# Patient Record
Sex: MALE | Race: WHITE | NOT HISPANIC OR LATINO | Employment: OTHER | URBAN - METROPOLITAN AREA
[De-identification: names, ages, dates, MRNs, and addresses within clinical notes are randomized per-mention and may not be internally consistent; named-entity substitution may affect disease eponyms.]

---

## 2017-05-25 ENCOUNTER — PATIENT OUTREACH (OUTPATIENT)
Dept: HEALTH INFORMATION MANAGEMENT | Facility: OTHER | Age: 67
End: 2017-05-25

## 2017-05-25 NOTE — PROGRESS NOTES
Attempt #:1    WebIZ Checked & Epic Updated: no  HealthConnect Verified: no  Verify PCP: yes    Communication Preference Obtained: yes     Review Care Team: yes    Annual Wellness Visit Scheduling  1. Scheduling Status:Not Scheduled. Patient states they are not interested     PT STATES HE COMPLETED AWV WITH NON [facility] PCP   Care Gap Scheduling (Attempt to Schedule EACH Overdue Care Gap!)     There are no preventive care reminders to display for this patient.      Almondy Activation: declined  Almondy Naty: no  Virtual Visits: no  Opt In to Text Messages: no

## 2017-06-17 ENCOUNTER — PATIENT OUTREACH (OUTPATIENT)
Dept: HEALTH INFORMATION MANAGEMENT | Facility: OTHER | Age: 67
End: 2017-06-17

## 2017-12-07 ENCOUNTER — TELEPHONE (OUTPATIENT)
Dept: PULMONOLOGY | Facility: HOSPICE | Age: 67
End: 2017-12-07

## 2017-12-07 DIAGNOSIS — J44.9 CHRONIC OBSTRUCTIVE PULMONARY DISEASE, UNSPECIFIED COPD TYPE (HCC): ICD-10-CM

## 2017-12-13 ENCOUNTER — OFFICE VISIT (OUTPATIENT)
Dept: PULMONOLOGY | Facility: HOSPICE | Age: 67
End: 2017-12-13
Payer: MEDICARE

## 2017-12-13 ENCOUNTER — NON-PROVIDER VISIT (OUTPATIENT)
Dept: PULMONOLOGY | Facility: HOSPICE | Age: 67
End: 2017-12-13
Payer: MEDICARE

## 2017-12-13 VITALS
HEART RATE: 81 BPM | RESPIRATION RATE: 16 BRPM | DIASTOLIC BLOOD PRESSURE: 78 MMHG | SYSTOLIC BLOOD PRESSURE: 126 MMHG | WEIGHT: 190 LBS | OXYGEN SATURATION: 91 % | BODY MASS INDEX: 27.2 KG/M2 | HEIGHT: 70 IN | TEMPERATURE: 97.9 F

## 2017-12-13 DIAGNOSIS — Z65.8 PSYCHOSOCIAL STRESSORS: ICD-10-CM

## 2017-12-13 DIAGNOSIS — F10.10 ALCOHOL ABUSE: ICD-10-CM

## 2017-12-13 DIAGNOSIS — R62.51 FAILURE TO THRIVE (0-17): ICD-10-CM

## 2017-12-13 DIAGNOSIS — J44.9 CHRONIC OBSTRUCTIVE PULMONARY DISEASE, UNSPECIFIED COPD TYPE (HCC): ICD-10-CM

## 2017-12-13 DIAGNOSIS — I27.81 COR PULMONALE (HCC): ICD-10-CM

## 2017-12-13 PROCEDURE — 99204 OFFICE O/P NEW MOD 45 MIN: CPT | Performed by: INTERNAL MEDICINE

## 2017-12-13 RX ORDER — ALBUTEROL SULFATE 90 UG/1
AEROSOL, METERED RESPIRATORY (INHALATION)
Refills: 3 | COMMUNITY
Start: 2017-11-17

## 2017-12-13 RX ORDER — BUMETANIDE 1 MG/1
1 TABLET ORAL DAILY
COMMUNITY
End: 2019-03-06

## 2017-12-13 RX ORDER — UREA 10 %
800 LOTION (ML) TOPICAL DAILY
COMMUNITY

## 2017-12-13 RX ORDER — ARFORMOTEROL TARTRATE 15 UG/2ML
15 SOLUTION RESPIRATORY (INHALATION) 2 TIMES DAILY
Qty: 60 ML | Refills: 6 | Status: SHIPPED | OUTPATIENT
Start: 2017-12-13 | End: 2018-02-09

## 2017-12-13 RX ORDER — ALBUTEROL SULFATE 2.5 MG/3ML
SOLUTION RESPIRATORY (INHALATION)
Refills: 11 | COMMUNITY
Start: 2017-11-12 | End: 2020-02-26

## 2017-12-13 NOTE — PROGRESS NOTES
Chief Complaint   Patient presents with   • Establish Care     Referred by  for COPD.       HPI: This patient is a 67 y.o. Male who is referred for COPD. He was evaluated through [facility] in 2012 for SS Disability evaluation at which time pulmonary function testing confirmed severe COPD, FEV1 1 L. He continued to smoke until 9 months ago. He has had progressively worsening exertional dyspnea over the past years, and is now symptomatic with ADLs. He has chronic cough with clear sputum and lower extremity edema. He has chronic wheezing and chest tightness. He cannot afford inhalers (Spiriva) and has been using his Ventolin/Albuterol nebulized excessively. He uses one Ventolin canister weekly.  He has an oxygen concentrator which he inherited, however does not have portable oxygen. He was placed on prednisone empirically by his PCP without subjective benefit, and has since tapered off.  He has a history of alcohol abuse, and continues to drink.     Past Medical History:   Diagnosis Date   • Asthma    • Back pain    • Bronchitis    • Chickenpox    • Chronic obstructive pulmonary disease (CMS-HCC)    • Depression    • [place] measles    • Hypertension    • Influenza    • Mumps    • Nasal drainage    • Pneumonia    • Pulmonary emphysema (CMS-[facility])    • Pulmonary hypertension    • Restless leg syndrome    • Tonsillitis        Social History     Social History   • Marital status: Single     Spouse name: N/A   • Number of children: N/A   • Years of education: N/A     Occupational History   • Not on file.     Social History Main Topics   • Smoking status: Former Smoker     Packs/day: 1.00     Years: 35.00     Types: Cigarettes     Quit date: 6/1/2017   • Smokeless tobacco: Never Used   • Alcohol use Yes      Comment: 12beers/day   • Drug use: No   • Sexual activity: Not on file     Other Topics Concern   • Not on file     Social History Narrative   • No narrative on file       Family History   Problem Relation Age of Onset    • Diabetes Mother    • Heart Disease Father        Current Outpatient Prescriptions on File Prior to Visit   Medication Sig Dispense Refill   • calcium carbonate (TUMS) 500 MG Chew Tab Take 1 Tab by mouth every four hours as needed (indigestion). 30 Tab 3   • enalapril (VASOTEC) 10 MG Tab Take 10 mg by mouth every day.     • hydrochlorothiazide (HYDRODIURIL) 25 MG Tab Take 25 mg by mouth every day.     • Glucosamine-Chondroit-Vit C-Mn (GLUCOSAMINE CHONDR 1500 COMPLX PO) Take 2 Tabs by mouth every day.     • acetaminophen 650 MG Tab Take 650 mg by mouth every 6 hours as needed (Mild Pain; (Pain scale 1-3); Temp greater than 100.5 F). 30 Tab 0   • heparin 5000 UNIT/ML Solution Inject 1 mL as instructed every 8 hours.  0   • fluoxetine (PROZAC) 20 MG Cap Take 1 Cap by mouth every day. 30 Cap 0   • ondansetron (ZOFRAN ODT) 4 MG TABLET DISPERSIBLE Take 1 Tab by mouth every four hours as needed for Nausea/Vomiting (give PO if no IV route available). 10 Tab 0   • docusate sodium 100mg/10mL (COLACE) 150 MG/15ML Liquid Take 10 mL by mouth every morning. 300 mL    • nicotine (NICODERM) 21 MG/24HR PATCH 24 HR Apply 1 Patch to skin as directed every 24 hours. 30 Patch 0   • nicotine polacrilex (NICORETTE) 2 MG Gum Take 1 Each by mouth every 1 hour as needed (For nicotine urge).  3   • CALCIUM PO Take 1 Tab by mouth every day.       No current facility-administered medications on file prior to visit.        Allergies: Codeine and Pcn [penicillins]    ROS:   Constitutional: Denies fevers, chills, night sweats, +fatigue,+ weight loss  Eyes: Denies vision loss, pain, drainage, double vision  Ears, Nose, Throat: Denies earache, +difficulty hearing, denies tinnitus, +nasal congestion, denies hoarseness  Cardiovascular: Denies chest pain, +tightness, denies palpitations, orthopnea, +LEedema  Respiratory: Denies shortness of breath, cough, wheezing, hemoptysis  Sleep: Denies daytime sleepiness, snoring, apneas, insomnia, morning  "headaches  GI: +heartburn, dysphagia, nausea, abdominal pain, diarrhea or constipation  : + frequent urination, denies hematuria, discharge or painful urination  Musculoskeletal: + back pain, painful joints, sore muscles  Neurological: + weakness, denies headaches  Skin: No rashes    Blood pressure 126/78, pulse 81, temperature 36.6 °C (97.9 °F), resp. rate 16, height 1.778 m (5' 10\"), weight 86.2 kg (190 lb), SpO2 91 %.    Physical Exam:  Appearance: Wheelchair-bound, in no acute distress  HEENT: Normocephalic, atraumatic, white sclera, PERRLA, oropharynx clear, poor dentition  Neck: No adenopathy or masses  Respiratory: no intercostal retractions or accessory muscle use  Lungs auscultation: Clear to auscultation bilaterally, diminished breath sounds  Cardiovascular: Regular rate rhythm. No murmurs, rubs or gallops.  1+ LE edema  Abdomen: soft, nondistended  Gait: Wheelchair-bound  Digits: No clubbing, cyanosis  Motor: No tremors  Orientation: Oriented to time, person and place    Diagnosis:  1. Chronic obstructive pulmonary disease, unspecified COPD type (CMS-HCC)  CT-CHEST (THORAX) W/O   2. Cor pulmonale (CMS-HCC)     3. Psychosocial stressors     4. Alcohol abuse     5. Failure to thrive (0-17)  CT-CHEST (THORAX) W/O       Plan:  The patient has chronic respiratory failure secondary to severe COPD. He quit smoking 9 months ago. He is on short acting beta agonist therapy only on account of the expense, which he is requiring excessively. He has failure to thrive, which may also be in part due to alcohol use.   He would benefit from supplemental oxygen 24/7.  Complete and permanent smoking cessation encouraged. We also discussed cutting back on his drinking.  We have provided him a list of resources for medication assistance.   Start nebulized formoterol twice a day, which may be more affordable than inhalers.  Continue albuterol every 4 hours when necessary.  Start supplemental oxygen at 2 L/m.   He was unable " to perform pulmonary function testing today. We will defer to a future date when able.  Obtain chest CAT scan to exclude comorbidities, i.e. malignancy, infection.  Return for after CT scan.

## 2017-12-14 ENCOUNTER — TELEPHONE (OUTPATIENT)
Dept: PULMONOLOGY | Facility: HOSPICE | Age: 67
End: 2017-12-14

## 2017-12-14 NOTE — TELEPHONE ENCOUNTER
The patient's wife, Becca called and stated that the Waylon copay was 500. Dollars.  I told her that it needed to be billed through Medicare Part B and then the copay would go way down.  I called the pharmacy and spoke to Stacy.  She did the computer work for the Part B billing and now the patient needs to go fill out paperwork.  I called Becca and told her that she needed to go to the pharmacy to fill out the paperwork and then they should be able to bill Part B.  She agreed to go do that now.

## 2017-12-21 ENCOUNTER — TELEPHONE (OUTPATIENT)
Dept: PULMONOLOGY | Facility: HOSPICE | Age: 67
End: 2017-12-21

## 2017-12-21 NOTE — TELEPHONE ENCOUNTER
The patient's wife, Becca called and stated that they still have not been able to get the Brovana.  I called the pharmacy and told them that they should have billed it through Medicare Part B on the 12/14 as the patient went in and filled out the paperwork then.  The tech that I spoke to told me that Stacy has been sick for the last two days, but that shouldn't matter as this has been since the 14th.  The tech put me on hold and ran the med again and it went through.  She stated that she will call the patient and let them know that they can come  the Brovana today.

## 2017-12-22 ENCOUNTER — HOSPITAL ENCOUNTER (OUTPATIENT)
Dept: RADIOLOGY | Facility: MEDICAL CENTER | Age: 67
End: 2017-12-22
Attending: INTERNAL MEDICINE
Payer: MEDICARE

## 2017-12-22 DIAGNOSIS — J44.9 CHRONIC OBSTRUCTIVE PULMONARY DISEASE, UNSPECIFIED COPD TYPE (HCC): ICD-10-CM

## 2017-12-22 DIAGNOSIS — R62.51 FAILURE TO THRIVE (0-17): ICD-10-CM

## 2017-12-22 PROCEDURE — 71250 CT THORAX DX C-: CPT

## 2018-01-16 ENCOUNTER — OFFICE VISIT (OUTPATIENT)
Dept: PULMONOLOGY | Facility: HOSPICE | Age: 68
End: 2018-01-16
Payer: MEDICARE

## 2018-01-16 VITALS
DIASTOLIC BLOOD PRESSURE: 82 MMHG | OXYGEN SATURATION: 91 % | RESPIRATION RATE: 15 BRPM | WEIGHT: 190 LBS | BODY MASS INDEX: 27.2 KG/M2 | SYSTOLIC BLOOD PRESSURE: 132 MMHG | HEART RATE: 115 BPM | HEIGHT: 70 IN

## 2018-01-16 DIAGNOSIS — J43.9 PULMONARY EMPHYSEMA, UNSPECIFIED EMPHYSEMA TYPE (HCC): ICD-10-CM

## 2018-01-16 DIAGNOSIS — J96.01 ACUTE RESPIRATORY FAILURE WITH HYPOXIA (HCC): ICD-10-CM

## 2018-01-16 NOTE — PROGRESS NOTES
Patient presented to the office today for CT results. He has very severe COPD FEV1 less than 1 L. He reports worsening dyspnea, cough and wheeze for the past 3 days. He reports generalized body aches. He did not have an influenza vaccine. He is tachycardic. He cannot speak in full sentences. He has not been able to use his nebulizer at home due to severe shortness of breath. He has significant increased work of breathing, audible wheeze. I recommended further evaluation in the ER. He will likely need to be admitted for higher level of care. He is hesitant due to cost of hospitalization. His significant other encourages him to be seen today. He agrees. He is transported via wheelchair by one of our medical assistant to [facility] emergency room. He will follow up after hospital stay.

## 2018-01-25 ENCOUNTER — TELEPHONE (OUTPATIENT)
Dept: PULMONOLOGY | Facility: HOSPICE | Age: 68
End: 2018-01-25

## 2018-01-26 NOTE — TELEPHONE ENCOUNTER
Temi,    The pt called requesting PA for rx Brovana neb ($209) and Spiriva ($400), states that they are to expensive and he doesn't know what he is going to do.  Please help!

## 2018-01-26 NOTE — TELEPHONE ENCOUNTER
Last time patient was seen he was sent to the hospital. Can you ask him if these are medications that were prescribed by the Hospital. If so please obtain discharge summary. We can provide samples of Spiriva in the meantime, but need DC summary to review

## 2018-01-26 NOTE — TELEPHONE ENCOUNTER
I spoke to this patient.  He doesn't want the Brovana, he states that it doesn't work for him.  He just likes Ventolin.  I am trying to fill out Spiriva Assistance paperwork for him but I don't know what strength Spiriva to put on the paperwork as I don't see that he was ever prescribed Spiriva.  Please advise, thank you.

## 2018-01-31 ENCOUNTER — TELEPHONE (OUTPATIENT)
Dept: PULMONOLOGY | Facility: HOSPICE | Age: 68
End: 2018-01-31

## 2018-01-31 DIAGNOSIS — J44.9 CHRONIC OBSTRUCTIVE PULMONARY DISEASE, UNSPECIFIED COPD TYPE (HCC): ICD-10-CM

## 2018-01-31 NOTE — TELEPHONE ENCOUNTER
This Rx is to be mailed along with the [employer] application to the patient's home. TB        Have we ever prescribed this med? No.  If yes, what date? New Rx, ok'd per Dr. Kim.    Last OV: 12/13/2017    Next OV: 2/9/2018    DX: COPD    Medications: Spiriva Respimat

## 2018-01-31 NOTE — TELEPHONE ENCOUNTER
This patient called and he is asking for assistance for Spiriva.  I started filling out the form to send him and realized that Spiriva was never prescribed for him as he could not afford it.  I got the DC summary from when he was in the hospital and they did not prescribe it there either.  Do you want me to do the assistance form for him and if so, what strength of Spiriva do you want me to request?  Please advise, thank you.

## 2018-02-08 ENCOUNTER — TELEPHONE (OUTPATIENT)
Dept: PULMONOLOGY | Facility: HOSPICE | Age: 68
End: 2018-02-08

## 2018-02-08 NOTE — TELEPHONE ENCOUNTER
The patient does not have prescription insurance.  That's why I sent the Spiriva assistance paperwork to him.

## 2018-02-08 NOTE — TELEPHONE ENCOUNTER
Ok he can discuss inhalers further at his next visit with Kahlil tomorrow. We can try to provide samples

## 2018-02-09 ENCOUNTER — OFFICE VISIT (OUTPATIENT)
Dept: PULMONOLOGY | Facility: HOSPICE | Age: 68
End: 2018-02-09
Payer: MEDICARE

## 2018-02-09 VITALS
SYSTOLIC BLOOD PRESSURE: 110 MMHG | DIASTOLIC BLOOD PRESSURE: 68 MMHG | HEIGHT: 70 IN | TEMPERATURE: 98.2 F | RESPIRATION RATE: 16 BRPM | WEIGHT: 175 LBS | OXYGEN SATURATION: 96 % | HEART RATE: 84 BPM | BODY MASS INDEX: 25.05 KG/M2

## 2018-02-09 DIAGNOSIS — F17.200 CURRENT SMOKER: ICD-10-CM

## 2018-02-09 DIAGNOSIS — R91.8 PULMONARY NODULES: ICD-10-CM

## 2018-02-09 DIAGNOSIS — R09.02 HYPOXIA: ICD-10-CM

## 2018-02-09 DIAGNOSIS — J96.11 CHRONIC RESPIRATORY FAILURE WITH HYPOXIA (HCC): ICD-10-CM

## 2018-02-09 DIAGNOSIS — J44.9 CHRONIC OBSTRUCTIVE PULMONARY DISEASE, UNSPECIFIED COPD TYPE (HCC): ICD-10-CM

## 2018-02-09 DIAGNOSIS — R59.9 ADENOPATHY: ICD-10-CM

## 2018-02-09 DIAGNOSIS — J84.9 INTERSTITIAL LUNG DISEASE (HCC): ICD-10-CM

## 2018-02-09 DIAGNOSIS — J43.2 CENTRILOBULAR EMPHYSEMA (HCC): ICD-10-CM

## 2018-02-09 PROCEDURE — 94761 N-INVAS EAR/PLS OXIMETRY MLT: CPT | Performed by: NURSE PRACTITIONER

## 2018-02-09 PROCEDURE — 99214 OFFICE O/P EST MOD 30 MIN: CPT | Performed by: NURSE PRACTITIONER

## 2018-02-09 RX ORDER — LORATADINE 10 MG
81 TABLET ORAL
Refills: 0 | COMMUNITY
Start: 2018-01-24 | End: 2019-03-06

## 2018-02-09 RX ORDER — DILTIAZEM HYDROCHLORIDE 120 MG/1
120 CAPSULE, EXTENDED RELEASE ORAL
Refills: 0 | COMMUNITY
Start: 2018-01-24 | End: 2019-03-06

## 2018-02-09 NOTE — PATIENT INSTRUCTIONS
1.  samples for Breo 200 1 puff daily, Spiriva Respimat 2 puffs once daily. Rinse mouth after use. May use Ventolin every 4hrs as needed as a rescue inhaler.

## 2018-02-09 NOTE — PROCEDURES
Multi-Ox Readings  Multi Ox #1     O2 sat % at rest 88   O2 sat % on exertion 87   O2 sat average on exertion     Multi Ox #2 3 LPM   O2 sat % at rest 96   O2 sat % on exertion 95   O2 sat average on exertion       Oxygen Use 1.5 (O2 concentrator )   Oxygen Frequency 24/7   Duration of need     Is the patient mobile within the home?     CPAP Use?     BIPAP Use?     Servo Titration

## 2018-02-09 NOTE — PROGRESS NOTES
Chief Complaint   Patient presents with   • Follow-Up       HPI:  Parveen Blanc is a 67 y.o. year old male here today for follow-up on referred for COPD. He was evaluated through [facility] in 2012 for SS Disability evaluation at which time pulmonary function testing confirmed severe COPD, FEV1 1 L.  He has had progressively worsening exertional dyspnea over the past years, and is now symptomatic with ADLs. He has chronic cough with clear sputum and lower extremity edema. He has chronic wheezing and chest tightness. He cannot afford inhalers (Spiriva) and has been using his Ventolin/Albuterol nebulized excessively. He uses one Ventolin canister weekly.  He has an oxygen concentrator which he inherited, however does not have portable oxygen. He was placed on prednisone empirically by his PCP without subjective benefit, and has since tapered off.  He has a history of alcohol abuse, and continues to drink. Last OV, he presented for evaluation for CT results and was noted tachycardic, unable to speak full sentences due to significant dyspnea and wheezing. He was admitted to [facility]. He was treated for AECOPD with IV steroids and then oral doxycycline due to suspected pneumonia with sepsis with prednisone taper. Cultures were negative to date. Dimer was negative. He was put on oxygen therapy and DC'd home with it. Multi ox in office today indicates need for 3L at rest/exertion. Reviewed this with patient. Atrial fibrillation was noted on monitoring but rate controlled with diltiazem. He declined use of anticoagulation. He is taking an 81mg ASA daily. ECHO indicated normal LVEF, Grade I Diastolic dysfunction and LVH. Troponin negative.     CT scan 12/22/17 indicated:   1.  Centrilobular and paraseptal emphysema is most pronounced in the upper lobes and superior segments of the lower lobes.  2.  Peripheral reticular disease in the lung bases, suggestive of early interstitial disease.  3.  Subcentimeter left lower lobe nodules are  likely inflammatory in nature. Recommendations are given below.  4.  Atherosclerosis.  5.  Mild nonspecific enlargement in mediastinal lymph nodes.  6.  Lobular hepatic contour suggestive of cirrhosis.  7.  Splenic size is at the upper limits of normal.  8.  Cholelithiasis    I reviewed findings with patient and wife. We will repeat CT scan in 4-6 mos for nodule monitoring. We may want to pursue HRCT, but patient is not interested in ILD work up due to possible cost. We reviewed smoking and alcohol cessation in depth. He currently drinks 4 beers daily and smokes 2 cigarettes. Advised to f/u with PCP for abdominal U/S for splenic and hepatic exam findings.    Today he notes dyspnea with minimal exertion and ADL's. He feels better since hospitalization. He notes occasional cough with minimal phlegm production and no current wheezing. He denies fever, chills, night sweats, chest pain, or hemoptysis. He does have pedal edema, L>R with pitting. He presents in a wheelchair today. RA sat was initially 96% with rooming but dropped to 87%. He wants to make changes to health, but declines most things with any cost. He is pursuing assistance program for spiriva respimat use. He has been using Ventolin up to 6-8x's per day. We discussed inhalers indepth. I will obtain samples for him to start therapy.         ROS: As per HPI and otherwise negative if not stated.    Past Medical History:   Diagnosis Date   • Asthma    • Back pain    • Bronchitis    • Chickenpox    • Chronic obstructive pulmonary disease (CMS-HCC)    • Depression    • [place] measles    • Hypertension    • Influenza    • Mumps    • Nasal drainage    • Pneumonia    • Pulmonary emphysema (CMS-HCC)    • Pulmonary hypertension    • Restless leg syndrome    • Tonsillitis        Past Surgical History:   Procedure Laterality Date   • LAMINOTOMY     • TONSILLECTOMY         Family History   Problem Relation Age of Onset   • Diabetes Mother    • Heart Disease Father   "      Social History     Social History   • Marital status: Single     Spouse name: N/A   • Number of children: N/A   • Years of education: N/A     Occupational History   • Not on file.     Social History Main Topics   • Smoking status: Current Every Day Smoker     Packs/day: 0.50     Years: 35.00     Types: Cigarettes     Last attempt to quit: 6/1/2017   • Smokeless tobacco: Never Used   • Alcohol use Yes      Comment: 12 beers/day   • Drug use: No   • Sexual activity: Not on file     Other Topics Concern   • Not on file     Social History Narrative   • No narrative on file       Allergies as of 02/09/2018 - Reviewed 02/09/2018   Allergen Reaction Noted   • Codeine Hives 01/26/2016   • Pcn [penicillins] Hives 01/26/2016        @Vital signs for this encounter:  Vitals:    02/09/18 0812   Height: 1.778 m (5' 10\")   Weight: 79.4 kg (175 lb)   Weight % change since last entry.: 0 %   BP: 110/68   Pulse: 84   BMI (Calculated): 25.11   Resp: 16   Temp: 36.8 °C (98.2 °F)   O2 sat % on O2: 96 %       Current medications as of today   Current Outpatient Prescriptions   Medication Sig Dispense Refill   • [employer] ASPIRIN ADULT LOW DOSE 81 MG Chew Tab chewable tablet Take 81 mg by mouth every day.  0   • diltiazem (CARDIZEM SR) 120 MG SR capsule Take 120 mg by mouth every day.  0   • VENTOLIN  (90 Base) MCG/ACT Aero Soln inhalation aerosol INHALE 1-2 PUFFS EVERY 4-6 HOURS AS NEEDED AND AS DIRECTED.  3   • Multiple Vitamins-Minerals (CENTRUM SILVER 50+MEN) Tab Take 1 tablet by mouth every day.     • bumetanide (BUMEX) 1 MG Tab Take 1 mg by mouth every day.     • folic acid (FOLVITE) 800 MCG tablet Take 800 mcg by mouth every day.     • acetaminophen 650 MG Tab Take 650 mg by mouth every 6 hours as needed (Mild Pain; (Pain scale 1-3); Temp greater than 100.5 F). 30 Tab 0   • heparin 5000 UNIT/ML Solution Inject 1 mL as instructed every 8 hours.  0   • docusate sodium 100mg/10mL (COLACE) 150 MG/15ML Liquid Take 10 mL by mouth " every morning. 300 mL    • enalapril (VASOTEC) 10 MG Tab Take 10 mg by mouth every day.     • Glucosamine-Chondroit-Vit C-Mn (GLUCOSAMINE CHONDR 1500 COMPLX PO) Take 2 Tabs by mouth every day.     • Tiotropium Bromide Monohydrate (SPIRIVA RESPIMAT) 2.5 MCG/ACT Aero Soln Inhale 2 Puffs by mouth every day. 3 Inhaler 3   • albuterol (PROVENTIL) 2.5mg/3ml Nebu Soln solution for nebulization USE 1 UNIT DOSE IN NEBULIZER EVERY 4 HOURS AS NEEDED.  11   • Arformoterol Tartrate (BROVANA) 15 MCG/2ML Nebu Soln 2 mL by Nebulization route 2 Times a Day. 1 vial via nebulizer twice a day. 60 mL 6   • calcium carbonate (TUMS) 500 MG Chew Tab Take 1 Tab by mouth every four hours as needed (indigestion). 30 Tab 3   • fluoxetine (PROZAC) 20 MG Cap Take 1 Cap by mouth every day. 30 Cap 0   • ondansetron (ZOFRAN ODT) 4 MG TABLET DISPERSIBLE Take 1 Tab by mouth every four hours as needed for Nausea/Vomiting (give PO if no IV route available). 10 Tab 0   • nicotine (NICODERM) 21 MG/24HR PATCH 24 HR Apply 1 Patch to skin as directed every 24 hours. 30 Patch 0   • nicotine polacrilex (NICORETTE) 2 MG Gum Take 1 Each by mouth every 1 hour as needed (For nicotine urge).  3   • hydrochlorothiazide (HYDRODIURIL) 25 MG Tab Take 25 mg by mouth every day.     • CALCIUM PO Take 1 Tab by mouth every day.       No current facility-administered medications for this visit.          Physical Exam:   Gen:           Alert and oriented, No apparent distress. Mood and affect appropriate, normal interaction with examiner.  Eyes:          PERRL, EOM intact, sclere white, conjunctive moist.  Ears:          Not examined.  Hearing:     Grossly intact.  Nose:          Normal, no lesions or deformities.  Dentition:    Poor dentition.  Oropharynx:   Tongue normal, posterior pharynx without erythema or exudate.  Mallampati Classification: 3  Neck:        Supple, trachea midline, no masses.  Respiratory Effort: No intercostal retractions or use of accessory muscles.    Lung Auscultation:      Diminished t/o; no rales but expiratory trace wheeze noted t/o.  CV:            Regular rate and rhythm. No murmurs, rubs or gallops.  Abd:           Not examined.  Lymphadenopathy: Not examined.  Gait and Station: Normal.  Digits and Nails: No clubbing, cyanosis, petechiae, or nodes.   Cranial Nerves: II-XII grossly intact.  Skin:        No rashes, lesions or ulcers noted.               Ext:           No cyanosis but BLE pedal 1+ pitting edema.      Assessment:  1. Chronic respiratory failure with hypoxia (CMS-HCC)     2. Centrilobular emphysema (CMS-HCC)     3. Current smoker     4. Pulmonary nodules     5. BMI 25.0-25.9,adult         Immunizations:    Flu:declines  Pneumovax 23:declines  Prevnar 13:declines    Plan:Face to face 40 minutes discussing copd/inhaler/oxygen education and pathophysiology. Answered all patient questions to their satisfaction.    1. Declines vaccination.  2. PFT to be completed prior to next OV. Would benefit from pulmonary rehab if he stops smoking.  3. Alcohol and smoking cessation discussed in depth.  4. Start Breo 200 1 puff daily, Spiriva Respimat 2 puffs once daily. Rinse mouth after use. May use Ventolin every 4hrs as needed as a rescue inhaler.  5. Complete CT scan in 4 months for f/u of pulmonary nodules. Consider ILD work up with HRCT.  6. F/u with PCP regarding U/S of abdominal due to CT scan results of hepatic and spleenic findings.  7. Discussed respiratory hygiene.  8. DME O2 use 3L 24/7.  9. Follow up in 2 months with PFT, sooner if needed.

## 2018-04-02 ENCOUNTER — HOSPITAL ENCOUNTER (OUTPATIENT)
Dept: RADIOLOGY | Facility: MEDICAL CENTER | Age: 68
End: 2018-04-02

## 2018-04-11 ENCOUNTER — OFFICE VISIT (OUTPATIENT)
Dept: PULMONOLOGY | Facility: HOSPICE | Age: 68
End: 2018-04-11
Payer: MEDICARE

## 2018-04-11 ENCOUNTER — NON-PROVIDER VISIT (OUTPATIENT)
Dept: PULMONOLOGY | Facility: HOSPICE | Age: 68
End: 2018-04-11
Payer: MEDICARE

## 2018-04-11 VITALS
DIASTOLIC BLOOD PRESSURE: 82 MMHG | BODY MASS INDEX: 25.05 KG/M2 | HEIGHT: 70 IN | TEMPERATURE: 98.1 F | WEIGHT: 175 LBS | SYSTOLIC BLOOD PRESSURE: 121 MMHG | OXYGEN SATURATION: 98 % | HEART RATE: 80 BPM | RESPIRATION RATE: 16 BRPM

## 2018-04-11 DIAGNOSIS — J43.2 CENTRILOBULAR EMPHYSEMA (HCC): ICD-10-CM

## 2018-04-11 DIAGNOSIS — F17.200 CURRENT SMOKER: ICD-10-CM

## 2018-04-11 DIAGNOSIS — R91.8 PULMONARY NODULES: ICD-10-CM

## 2018-04-11 DIAGNOSIS — J96.11 CHRONIC RESPIRATORY FAILURE WITH HYPOXIA (HCC): ICD-10-CM

## 2018-04-11 DIAGNOSIS — J44.9 CHRONIC OBSTRUCTIVE PULMONARY DISEASE, UNSPECIFIED COPD TYPE (HCC): ICD-10-CM

## 2018-04-11 PROCEDURE — 94729 DIFFUSING CAPACITY: CPT | Performed by: INTERNAL MEDICINE

## 2018-04-11 PROCEDURE — 94060 EVALUATION OF WHEEZING: CPT | Performed by: INTERNAL MEDICINE

## 2018-04-11 PROCEDURE — 99214 OFFICE O/P EST MOD 30 MIN: CPT | Performed by: NURSE PRACTITIONER

## 2018-04-11 PROCEDURE — 94726 PLETHYSMOGRAPHY LUNG VOLUMES: CPT | Performed by: INTERNAL MEDICINE

## 2018-04-11 ASSESSMENT — PULMONARY FUNCTION TESTS
FEV1/FVC_PERCENT_LLN: 62
FEV1: .99
FEV1/FVC_PERCENT_PREDICTED: 45
FEV1/FVC: 37
FEV1/FVC_PERCENT_CHANGE: -6
FEV1/FVC: 34
FEV1/FVC_PERCENT_PREDICTED: 49
FEV1_PERCENT_PREDICTED: 32
FEV1_LLN: 2.71
FEV1_PERCENT_PREDICTED: 30
FEV1/FVC_PERCENT_PREDICTED: 74
FEV1/FVC: 34.26
FVC: 2.84
FEV1_PERCENT_CHANGE: 1
FEV1/FVC: 37
FVC_PREDICTED: 4.37
FEV1/FVC_PERCENT_CHANGE: -500
FEV1_PREDICTED: 3.24
FVC_LLN: 3.65
FVC_PERCENT_PREDICTED: 66
FVC: 2.89
FEV1/FVC_PERCENT_PREDICTED: 49
FEV1/FVC_PREDICTED: 74
FEV1/FVC_PERCENT_PREDICTED: 46
FVC_PERCENT_PREDICTED: 65
FEV1: 1.04
FEV1_PERCENT_CHANGE: -5

## 2018-04-11 NOTE — PROCEDURES
Good patient effort & cooperation.  The results of this test meet the ATS/ERS standards for acceptability and repeatability.  Predicted equations for Spirometry are N-Dulce II, ITS for lung volumes, and [place] for DLCO.  The DLCO was uncorrected for Hgb.  A bronchodilator of Ventolin HFA- 2puffs via spacer were administered.  DLCO was performed during dilation period.    1. Baseline spirometry demonstrates a severe reduction in FEV1 at 32% of predicted. FEV1/FVC ratio is reduced at 37%.    2. After administration of an inhaled bronchodilator there is no improvement in FEV1.    3. Lung volumes demonstrate hyperinflation.    4. Gas exchange as estimated by DLCO is reduced at 68% of predicted.    5. Airway resistance is increased.    6. Flow volume loop is consistent with obstruction.      Impression:    This study demonstrates the presence of severe obstructive lung disease. No reversibility is noted on the study.

## 2018-04-11 NOTE — LETTER
OJANIE Martinez  [facility]monary [facility]   [address],   [facility] [facility] [address] [place] [address]  Phone: [phone] - Fax: [phone]           Encounter Date: 4/11/2018  Provider: JOANIE Martinez  Location of Care: [facility] PULMONARY MEDICINE      Patient:   Parveen Blanc   MR Number: [id number]   YOB: 1950     PROGRESS NOTE:  No notes on file      Electronically signed by JOANIE Martinez  on 04/11/18    Agusto Bhatt M.D.  [id number]  [address] [place] [id number]  VIA Facsimile: [phone]

## 2018-04-11 NOTE — PROGRESS NOTES
Chief Complaint   Patient presents with   • Follow-Up       HPI:  Parveen Blanc is a 67 y.o. year old male here today for follow-up on COPD. He was evaluated through [facility] in 2012 for Disability evaluation at which time pulmonary function testing confirmed severe COPD, FEV1 1 L.  Updated PFT 4/11/2018 indicates FEV1 1.04 L or 32% predicted, FEV1/FVC ratio 37, and a DLCO 60% predicted. He has had progressively worsening exertional dyspnea over the past years, and is now symptomatic with ADLs. He was started on Breo 200mcg 1 puff Qd and Spiriva Respimat 2.5mcg 2 puffs QD last OV with significant benefit. He uses SOFIA 1x daily now instead of going through entire medication in 1 week. He relies on samples at the moment. He has no prescription coverage. He has not met criteria for assistance programs. He notes cough with chronic white phlegm production. He denies wheezing. He denies chest pain. He continues to smoke 1/2 pack daily, 17.5PYH. He has attempted Chantix with side effects. He has not followed up with PCP due to cost of visit.    He has a history of alcohol abuse, and continues to drink. Last OV, he presented for evaluation for CT results and was noted tachycardic, unable to speak full sentences due to significant dyspnea and wheezing. He was admitted to [facility] 1/2018. He was treated for AECOPD with IV steroids and then oral doxycycline due to suspected pneumonia with sepsis with prednisone taper. Cultures were negative to date. Dimer was negative. He was put on oxygen therapy and DC'd home with it. Multi ox in office today indicates need for 3L at rest/exertion. Reviewed this with patient. Atrial fibrillation was noted on monitoring but rate controlled with diltiazem. He declined use of anticoagulation. He is taking an 81mg ASA daily. ECHO indicated normal LVEF, Grade I Diastolic dysfunction and LVH. Troponin negative.      CT scan 12/22/17 indicated:   1.  Centrilobular and paraseptal emphysema is most  pronounced in the upper lobes and superior segments of the lower lobes.  2.  Peripheral reticular disease in the lung bases, suggestive of early interstitial disease.  3.  Subcentimeter left lower lobe nodules are likely inflammatory in nature. Recommendations are given below.  4.  Atherosclerosis.  5.  Mild nonspecific enlargement in mediastinal lymph nodes.  6.  Lobular hepatic contour suggestive of cirrhosis.  7.  Splenic size is at the upper limits of normal.  8.  Cholelithiasis     I reviewed findings with patient and wife. We will repeat CT scan in 4-6 mos for nodule monitoring - due May 2018. We may want to pursue HRCT, but patient is not interested in ILD work up due to possible cost. We reviewed smoking and alcohol cessation in depth. He currently drinks 4 beers daily and 1/2 pack smoking daily. Advised to f/u with PCP for abdominal U/S for splenic and hepatic exam findings.     ROS: As per HPI and otherwise negative if not stated.    Past Medical History:   Diagnosis Date   • Asthma    • Back pain    • Bronchitis    • Chickenpox    • Chronic obstructive pulmonary disease (CMS-HCC)    • Depression    • Welsh measles    • Hypertension    • Influenza    • Mumps    • Nasal drainage    • Pneumonia    • Pulmonary emphysema (CMS-HCC)    • Pulmonary hypertension    • Restless leg syndrome    • Tonsillitis        Past Surgical History:   Procedure Laterality Date   • LAMINOTOMY     • TONSILLECTOMY         Family History   Problem Relation Age of Onset   • Diabetes Mother    • Heart Disease Father        Social History     Social History   • Marital status: Single     Spouse name: N/A   • Number of children: N/A   • Years of education: N/A     Occupational History   • Not on file.     Social History Main Topics   • Smoking status: Current Every Day Smoker     Packs/day: 0.50     Years: 35.00     Types: Cigarettes     Last attempt to quit: 6/1/2017   • Smokeless tobacco: Never Used   • Alcohol use Yes      Comment:  "12 beers/day   • Drug use: No   • Sexual activity: Not on file     Other Topics Concern   • Not on file     Social History Narrative   • No narrative on file       Allergies as of 04/11/2018 - Reviewed 04/11/2018   Allergen Reaction Noted   • Codeine Hives 01/26/2016   • Pcn [penicillins] Hives 01/26/2016        @Vital signs for this encounter:  Vitals:    04/11/18 1402   Height: 1.778 m (5' 10\")   Weight: 79.4 kg (175 lb)   Weight % change since last entry.: 0 %   BP: 121/82   Pulse: 80   BMI (Calculated): 25.11   Resp: 16   Temp: 36.7 °C (98.1 °F)   O2 sat % on O2: 98 %       Current medications as of today   Current Outpatient Prescriptions   Medication Sig Dispense Refill   • Fluticasone Furoate-Vilanterol (BREO ELLIPTA) 200-25 MCG/INH AEROSOL POWDER, BREATH ACTIVATED Inhale 1 Puff by mouth every day. Rinse mouth after use. 2 Each 0   • Tiotropium Bromide Monohydrate (SPIRIVA RESPIMAT) 2.5 MCG/ACT Aero Soln Inhale 2 Puffs by mouth every day. 2 Inhaler 0   • [facility] ASPIRIN ADULT LOW DOSE 81 MG Chew Tab chewable tablet Take 81 mg by mouth every day.  0   • diltiazem (CARDIZEM SR) 120 MG SR capsule Take 120 mg by mouth every day.  0   • albuterol (PROVENTIL) 2.5mg/3ml Nebu Soln solution for nebulization USE 1 UNIT DOSE IN NEBULIZER EVERY 4 HOURS AS NEEDED.  11   • Multiple Vitamins-Minerals (CENTRUM SILVER 50+MEN) Tab Take 1 tablet by mouth every day.     • bumetanide (BUMEX) 1 MG Tab Take 1 mg by mouth every day.     • folic acid (FOLVITE) 800 MCG tablet Take 800 mcg by mouth every day.     • acetaminophen 650 MG Tab Take 650 mg by mouth every 6 hours as needed (Mild Pain; (Pain scale 1-3); Temp greater than 100.5 F). 30 Tab 0   • heparin 5000 UNIT/ML Solution Inject 1 mL as instructed every 8 hours.  0   • docusate sodium 100mg/10mL (COLACE) 150 MG/15ML Liquid Take 10 mL by mouth every morning. 300 mL    • enalapril (VASOTEC) 10 MG Tab Take 10 mg by mouth every day.     • Glucosamine-Chondroit-Vit C-Mn (GLUCOSAMINE " CHONDR 1500 COMPLX PO) Take 2 Tabs by mouth every day.     • VENTOLIN  (90 Base) MCG/ACT Aero Soln inhalation aerosol INHALE 1-2 PUFFS EVERY 4-6 HOURS AS NEEDED AND AS DIRECTED.  3   • calcium carbonate (TUMS) 500 MG Chew Tab Take 1 Tab by mouth every four hours as needed (indigestion). 30 Tab 3     No current facility-administered medications for this visit.          Physical Exam:   Gen:           Alert and oriented, No apparent distress. Mood and affect appropriate, normal interaction with examiner.  Eyes:          PERRL, EOM intact, sclere white, conjunctive moist.  Ears:          Not examined.  Hearing:     Grossly intact.  Nose:          Normal, no lesions or deformities.  Dentition:    Poor dentition.  Oropharynx:   Tongue normal, posterior pharynx without erythema or exudate.  Mallampati Classification: 2/3  Neck:        Supple, trachea midline, no masses.  Respiratory Effort: No intercostal retractions or use of accessory muscles.   Lung Auscultation:      Diminished t/o; no rales, rhonchi or wheezing.  CV:            Regular rate and rhythm. No murmurs, rubs or gallops.  Abd:           Not examined.   Lymphadenopathy: Not examined.  Gait and Station: Normal.  Digits and Nails: No clubbing, cyanosis, petechiae, or nodes.   Cranial Nerves: II-XII grossly intact.  Skin:        No rashes, lesions or ulcers noted.               Ext:           No cyanosis or edema.      Assessment:  1. Chronic respiratory failure with hypoxia ([facility])  REFERRAL TO TOBACCO CESSATION PROGRAM   2. Centrilobular emphysema ([facility])  REFERRAL TO TOBACCO CESSATION PROGRAM   3. Current smoker  REFERRAL TO TOBACCO CESSATION PROGRAM   4. Pulmonary nodules     5. BMI 25.0-25.9,adult     6. Chronic obstructive pulmonary disease, unspecified COPD type (CMS-HCC)  Tiotropium Bromide Monohydrate (SPIRIVA RESPIMAT) 2.5 MCG/ACT Aero Soln       Immunizations:    Flu:declines  Pneumovax 23:declines  Prevnar 13:declines    Plan: Face to  face 30 minutes discussing COPD education and pathophysiology. Answered all patient questions to their satisfaction.    1. Continue inhaler regimen. Rx for samples given. Assistance program information provided.  2. Continue 3L oxygen 24/7.  3. Advised to f/u with PCP regarding abnormal CT results of liver/spleen.  4. Add mucinex 600mg BID for sputum.  5. Strongly encouraged smoking cessation. Referral to smoking cessation program.  6. Discussed respiratory hygiene.  7. CT scan due May 2018 for nodule monitoring.  8. Follow up in 3 months with CT scan, sooner if needed.

## 2018-06-12 DIAGNOSIS — J44.9 CHRONIC OBSTRUCTIVE PULMONARY DISEASE, UNSPECIFIED COPD TYPE (HCC): ICD-10-CM

## 2018-06-12 NOTE — TELEPHONE ENCOUNTER
Have we ever prescribed this med? Yes.  If yes, what date? 04/11/18    Last OV: 04/11/18    Next OV: 07/11/18    DX: COPD    Medications:Spiriva, Breo

## 2018-06-13 ENCOUNTER — HOSPITAL ENCOUNTER (OUTPATIENT)
Dept: RADIOLOGY | Facility: MEDICAL CENTER | Age: 68
End: 2018-06-13
Attending: NURSE PRACTITIONER
Payer: MEDICARE

## 2018-06-13 DIAGNOSIS — R91.8 PULMONARY NODULES: ICD-10-CM

## 2018-06-13 DIAGNOSIS — J84.9 INTERSTITIAL LUNG DISEASE (HCC): ICD-10-CM

## 2018-06-13 PROCEDURE — 71250 CT THORAX DX C-: CPT

## 2018-07-11 ENCOUNTER — TELEPHONE (OUTPATIENT)
Dept: PULMONOLOGY | Facility: HOSPICE | Age: 68
End: 2018-07-11

## 2018-07-11 ENCOUNTER — OFFICE VISIT (OUTPATIENT)
Dept: PULMONOLOGY | Facility: HOSPICE | Age: 68
End: 2018-07-11
Payer: MEDICARE

## 2018-07-11 VITALS
SYSTOLIC BLOOD PRESSURE: 128 MMHG | DIASTOLIC BLOOD PRESSURE: 82 MMHG | HEIGHT: 70 IN | WEIGHT: 190 LBS | BODY MASS INDEX: 27.2 KG/M2 | TEMPERATURE: 98.5 F | HEART RATE: 78 BPM | OXYGEN SATURATION: 94 % | RESPIRATION RATE: 16 BRPM

## 2018-07-11 DIAGNOSIS — R91.8 PULMONARY NODULES: Chronic | ICD-10-CM

## 2018-07-11 DIAGNOSIS — J44.9 CHRONIC OBSTRUCTIVE PULMONARY DISEASE, UNSPECIFIED COPD TYPE (HCC): ICD-10-CM

## 2018-07-11 DIAGNOSIS — J43.2 CENTRILOBULAR EMPHYSEMA (HCC): Chronic | ICD-10-CM

## 2018-07-11 DIAGNOSIS — F17.200 CURRENT SMOKER: Chronic | ICD-10-CM

## 2018-07-11 DIAGNOSIS — J96.11 CHRONIC RESPIRATORY FAILURE WITH HYPOXIA (HCC): Chronic | ICD-10-CM

## 2018-07-11 PROCEDURE — 99214 OFFICE O/P EST MOD 30 MIN: CPT | Performed by: NURSE PRACTITIONER

## 2018-07-11 NOTE — TELEPHONE ENCOUNTER
Patient is with [employer]ell[employer]. He inquired into travel POC for future use and they said they do not have these.  How can we facilitate this for patient to travel to daughter's wedding February 2019?  Thanks Margie!

## 2018-07-11 NOTE — PROGRESS NOTES
No chief complaint on file.      HPI:  Parveen Blanc is a 68 y.o. year old male here today for follow-up on CT results. HX COPD. Current smoker 1/2 pack daily, 17.5PYH. He was evaluated through [facility] in 2012 for Disability evaluation at which time pulmonary function testing confirmed severe COPD, FEV1 1 L.  Updated PFT 4/11/2018 indicates FEV1 1.04 L or 32% predicted, FEV1/FVC ratio 37, and a DLCO 60% predicted. He has had progressively worsening exertional dyspnea over the past years, and is now symptomatic with ADLs. He was started on Breo 200mcg 1 puff Qd and Spiriva Respimat 2.5mcg 2 puffs QD last OV with significant benefit. He uses SOFIA daily but 1-4x's daily depending on activity. He relies on samples at the moment. He has no prescription coverage. He has not met criteria for assistance programs. He notes stable dyspnea with cough with chronic white phlegm production. He denies wheezing. He denies chest pain.  He has attempted Chantix with side effects. He has not followed up with PCP due to cost of visit. He remains on 3L O2 24/7.     He has a history of alcohol abuse, and continues to drink. He was [facility] to [facility] 1/[facility]. He was treated for AECOPD with IV steroids and then oral doxycycline due to suspected pneumonia with sepsis with prednisone taper. Cultures were negative to date.  Reviewed this with patient. Atrial fibrillation was noted on monitoring but rate controlled with diltiazem. He declined use of anticoagulation. He is taking an 81mg ASA daily. ECHO indicated normal LVEF, Grade I Diastolic dysfunction and LVH. Troponin negative.      CT scan 6/13/18 indicates:   1.  Stable 5 mm left lower lobe pulmonary nodule  2.  Emphysema  3.  Mild pulmonary parenchymal scarring  4.  Aortic and coronary atherosclerotic plaque  5.  Pulmonary artery hypertension  6.  Cirrhosis and portal hypertension  7.  Cholelithiasis    I reviewed findings with patient and wife. We will repeat CT scan in 1 year due to  stability - due 6/2019. We may want to pursue HRCT, but patient is not interested in ILD work up due to possible cost. We reviewed smoking and alcohol cessation in depth.        ROS: As per HPI and otherwise negative if not stated.    Past Medical History:   Diagnosis Date   • Asthma    • Back pain    • Bronchitis    • Chickenpox    • Chronic obstructive pulmonary disease (HCC)    • Depression    • [place] measles    • Hypertension    • Influenza    • Mumps    • Nasal drainage    • Pneumonia    • Pulmonary emphysema (HCC)    • Pulmonary hypertension (HCC)    • Restless leg syndrome    • Tonsillitis        Past Surgical History:   Procedure Laterality Date   • LAMINOTOMY     • TONSILLECTOMY         Family History   Problem Relation Age of Onset   • Diabetes Mother    • Heart Disease Father        Social History     Social History   • Marital status: Single     Spouse name: N/A   • Number of children: N/A   • Years of education: N/A     Occupational History   • Not on file.     Social History Main Topics   • Smoking status: Current Every Day Smoker     Packs/day: 0.50     Years: 35.00     Types: Cigarettes     Last attempt to quit: 6/1/2017   • Smokeless tobacco: Never Used   • Alcohol use Yes      Comment: 12 beers/day   • Drug use: No   • Sexual activity: Not on file     Other Topics Concern   • Not on file     Social History Narrative   • No narrative on file       Allergies as of 07/11/2018 - Reviewed 07/11/2018   Allergen Reaction Noted   • Codeine Hives 01/26/2016   • Pcn [penicillins] Hives 01/26/2016        @Vital signs for this encounter:  There were no vitals filed for this visit.    Current medications as of today   Current Outpatient Prescriptions   Medication Sig Dispense Refill   • Fluticasone Furoate-Vilanterol (BREO ELLIPTA) 200-25 MCG/INH AEROSOL POWDER, BREATH ACTIVATED Inhale 1 Puff by mouth every day. Rinse mouth after use. 2 Each 0   • Tiotropium Bromide Monohydrate (SPIRIVA RESPIMAT) 2.5 MCG/ACT  Aero Soln Inhale 2 Puffs by mouth every day. 2 Inhaler 0   • [facility] ASPIRIN ADULT LOW DOSE 81 MG Chew Tab chewable tablet Take 81 mg by mouth every day.  0   • diltiazem (CARDIZEM SR) 120 MG SR capsule Take 120 mg by mouth every day.  0   • VENTOLIN  (90 Base) MCG/ACT Aero Soln inhalation aerosol INHALE 1-2 PUFFS EVERY 4-6 HOURS AS NEEDED AND AS DIRECTED.  3   • albuterol (PROVENTIL) 2.5mg/3ml Nebu Soln solution for nebulization USE 1 UNIT DOSE IN NEBULIZER EVERY 4 HOURS AS NEEDED.  11   • Multiple Vitamins-Minerals (CENTRUM SILVER 50+MEN) Tab Take 1 tablet by mouth every day.     • bumetanide (BUMEX) 1 MG Tab Take 1 mg by mouth every day.     • folic acid (FOLVITE) 800 MCG tablet Take 800 mcg by mouth every day.     • acetaminophen 650 MG Tab Take 650 mg by mouth every 6 hours as needed (Mild Pain; (Pain scale 1-3); Temp greater than 100.5 F). 30 Tab 0   • calcium carbonate (TUMS) 500 MG Chew Tab Take 1 Tab by mouth every four hours as needed (indigestion). 30 Tab 3   • heparin 5000 UNIT/ML Solution Inject 1 mL as instructed every 8 hours.  0   • docusate sodium 100mg/10mL (COLACE) 150 MG/15ML Liquid Take 10 mL by mouth every morning. 300 mL    • enalapril (VASOTEC) 10 MG Tab Take 10 mg by mouth every day.     • Glucosamine-Chondroit-Vit C-Mn (GLUCOSAMINE CHONDR 1500 COMPLX PO) Take 2 Tabs by mouth every day.       No current facility-administered medications for this visit.          Physical Exam:   Gen:           Alert and oriented, No apparent distress. Mood and affect appropriate, normal interaction with examiner.  Eyes:          PERRL, EOM intact, sclere white, conjunctive moist.  Ears:          Not examined.   Hearing:     Grossly intact.  Nose:          Normal, no lesions or deformities.  Dentition:    Good dentition.  Oropharynx:   Tongue normal, posterior pharynx without erythema or exudate.  Mallampati Classification: 2/3  Neck:        Supple, trachea midline, no masses.  Respiratory Effort: No  intercostal retractions or use of accessory muscles.   Lung Auscultation:      Clear to auscultation bilaterally but diminished t/o; no rales, rhonchi or wheezing.  CV:            Regular rate and rhythm. No murmurs, rubs or gallops.  Abd:           Not examined.   Lymphadenopathy: Not examined.  Gait and Station: Normal.  Digits and Nails: No clubbing, cyanosis, petechiae, or nodes.   Cranial Nerves: II-XII grossly intact.  Skin:        No rashes, lesions or ulcers noted.               Ext:           No cyanosis or edema.      Assessment:  1. Chronic respiratory failure with hypoxia (HCC)     2. Chronic obstructive pulmonary disease, unspecified COPD type (HCC)  Fluticasone Furoate-Vilanterol (BREO ELLIPTA) 200-25 MCG/INH AEROSOL POWDER, BREATH ACTIVATED   3. Pulmonary nodules  CT-CHEST (THORAX) W/O   4. Centrilobular emphysema (HCC)     5. Current smoker  CT-CHEST (THORAX) W/O       Immunizations:    Flu:declines  Pneumovax 23:declines  Prevnar 13:declines    Plan:  1. Continue inhaler regimen. Rx for samples given. Assistance program information provided.  2. Continue 3L oxygen 24/7.  3. Advised to f/u with PCP regarding abnormal CT results of liver/spleen.  4. Add mucinex 600mg BID for sputum prn.  5. Strongly encouraged smoking cessation.  6. Discussed respiratory hygiene.  7. CT scan due June 2019 - continued smoker.  8. Follow up in 4 months to check symptoms, sooner if needed.  Patient will need travel POC February 2019 for travel to [place]. Need DME to arrange this. Will also want abx/steroid for travel purposes for emergencies.

## 2018-07-12 NOTE — TELEPHONE ENCOUNTER
I am reaching out to [employer] to verify this information but almost all the DME companies are charging additional costs for travel POCs anyway.  [facility] is the cheapest in town and will allow patients to make payment arrangements - even if they aren't patients of theirs.  If [employer] does not supply them anymore I will advise the patient to call them.

## 2018-07-24 NOTE — TELEPHONE ENCOUNTER
Finally was able to verify that [email or web address] is renting them for an additional charge of $100 a week  I left detailed message of the above info and also advised the patient that most of the DME companies are charging for the travel POCs now and$100 a week is actually reasonable as most are around $150-200 a week    Left direct line for more questions or assistance

## 2018-08-09 DIAGNOSIS — J44.9 CHRONIC OBSTRUCTIVE PULMONARY DISEASE, UNSPECIFIED COPD TYPE (HCC): ICD-10-CM

## 2018-08-09 NOTE — TELEPHONE ENCOUNTER
I spoke to the patient informed Sample approved and sent to [facility]

## 2018-08-09 NOTE — TELEPHONE ENCOUNTER
Have we ever prescribed this med? Yes.  If yes, what date? 7/11/18    Last OV: 7/11/18- Tierra Guillory     Next OV: 11/7/18- Tierra Guillory    DX: Copd    Medications: Fluticasone Furoate-Vilanterol (BREO ELLIPTA) 200-25 MCG/INH      Please provide sample.

## 2018-09-07 DIAGNOSIS — J44.9 CHRONIC OBSTRUCTIVE PULMONARY DISEASE, UNSPECIFIED COPD TYPE (HCC): ICD-10-CM

## 2018-09-07 NOTE — TELEPHONE ENCOUNTER
Have we ever prescribed this med? Yes.  If yes, what date? 8/9/18    Last OV: 7/11/18- [facility]    Next OV: 11/7/18    DX: COPD    Medications:Breo 200 sample

## 2018-10-10 DIAGNOSIS — J44.9 CHRONIC OBSTRUCTIVE PULMONARY DISEASE, UNSPECIFIED COPD TYPE (HCC): ICD-10-CM

## 2018-10-10 NOTE — TELEPHONE ENCOUNTER
Have we ever prescribed this med? Yes.  If yes, what date? 9/7/18    Last OV: 7/11/18    Next OV: 11/7/18    DX: Chronic obstructive pulmonary disease, unspecified COPD type (HCC) (J44.9)    Medications:  Samples  Fluticasone Furoate-Vilanterol (BREO ELLIPTA) 200-25 MCG/INH AEROSOL POWDER, BREATH ACTIVATED

## 2018-11-07 ENCOUNTER — OFFICE VISIT (OUTPATIENT)
Dept: PULMONOLOGY | Facility: HOSPICE | Age: 68
End: 2018-11-07
Payer: MEDICARE

## 2018-11-07 VITALS
DIASTOLIC BLOOD PRESSURE: 80 MMHG | TEMPERATURE: 98.8 F | SYSTOLIC BLOOD PRESSURE: 148 MMHG | HEART RATE: 88 BPM | BODY MASS INDEX: 27.2 KG/M2 | OXYGEN SATURATION: 91 % | WEIGHT: 190 LBS | RESPIRATION RATE: 16 BRPM | HEIGHT: 70 IN

## 2018-11-07 DIAGNOSIS — J96.11 CHRONIC RESPIRATORY FAILURE WITH HYPOXIA (HCC): Chronic | ICD-10-CM

## 2018-11-07 DIAGNOSIS — R91.8 PULMONARY NODULES: Chronic | ICD-10-CM

## 2018-11-07 DIAGNOSIS — J43.2 CENTRILOBULAR EMPHYSEMA (HCC): Chronic | ICD-10-CM

## 2018-11-07 DIAGNOSIS — F17.200 CURRENT SMOKER: Chronic | ICD-10-CM

## 2018-11-07 PROCEDURE — 99214 OFFICE O/P EST MOD 30 MIN: CPT | Performed by: NURSE PRACTITIONER

## 2018-11-07 RX ORDER — PREDNISONE 10 MG/1
TABLET ORAL
Qty: 40 TAB | Refills: 1 | Status: SHIPPED | OUTPATIENT
Start: 2018-11-07 | End: 2019-03-06

## 2018-11-07 RX ORDER — DOXYCYCLINE HYCLATE 100 MG/1
100 CAPSULE ORAL 2 TIMES DAILY
Qty: 20 CAP | Refills: 0 | Status: SHIPPED | OUTPATIENT
Start: 2018-11-07 | End: 2019-03-06

## 2018-11-07 NOTE — LETTER
JOANIE Martinez  [facility] Pulmonary Medicine   [address] [place] [address] [place] [address]  Phone: [phone] - Fax: [phone]           Encounter Date: 11/7/2018  Provider: JOANIE Martinez  Location of Care: [facility] PULMONARY MEDICINE      Patient:   Parveen Blanc   MR Number: [id number]   YOB: 1950     PROGRESS NOTE:  Chief Complaint   Patient presents with   • COPD       HPI:  Parveen Blanc is a 68 y.o. year old male here today for follow-up on severe COPD with chronic respiratory failure and pulmonary nodules.     Current smoker 1 pack daily, 17.5PYH. He was evaluated through [facility] in 2012 for Disability evaluation at which time pulmonary function testing confirmed severe COPD, FEV1 1 L.  Updated PFT 4/11/2018 indicates FEV1 1.04 L or 32% predicted, FEV1/FVC ratio 37, and a DLCO 60% predicted. He has had progressively worsening exertional dyspnea over the past years, and is now symptomatic with ADLs. He is sedentary most of the day due to his breathing. He is in wheelchair today. He remains on Breo 200mcg 1 puff Qd and Spiriva Respimat 2.5mcg 2 puffs QD with significant benefit. He uses SOFIA daily but 1-4x's daily depending on activity. He utilizes nebulizer qhs. He relies on samples at the moment. He has no prescription coverage. He has not met criteria for assistance programs. He remains on 3L O2 24/7. He is not motivated to quit smoking/drinking. He notes slightly worsening dyspnea since last OV. He notes chronic cough with clear phlegm production. He has intermittent wheezing. No significant chest pain or tightness.   He has a history of alcohol abuse, and continues to drink 10 beers daily.    Last hospitalized 1/2018 at [facility] for AECOPD and suspected pneumonia with sepsis. Atrial fibrillation was noted on monitoring but rate controlled with diltiazem. He declined use of anticoagulation. He is taking an 81mg ASA daily. ECHO indicated  normal LVEF, Grade I Diastolic dysfunction and LVH. Troponin negative.      CT scan 6/13/18 indicates:   1.  Stable 5 mm left lower lobe pulmonary nodule  2.  Emphysema  3.  Mild pulmonary parenchymal scarring  4.  Aortic and coronary atherosclerotic plaque  5.  Pulmonary artery hypertension  6.  Cirrhosis and portal hypertension  7.  Cholelithiasis     We will repeat CT scan in 1 year due to stability - due 6/2019. We may want to pursue HRCT, but patient is not interested in ILD work up due to possible cost.   We reviewed smoking and alcohol cessation in depth.      ROS: As per HPI and otherwise negative if not stated.    Past Medical History:   Diagnosis Date   • Asthma    • Back pain    • Bronchitis    • Chickenpox    • Chronic obstructive pulmonary disease (HCC)    • Depression    • [place] measles    • Hypertension    • Influenza    • Mumps    • Nasal drainage    • Pneumonia    • Pulmonary emphysema (HCC)    • Pulmonary hypertension (HCC)    • Restless leg syndrome    • Tonsillitis        Past Surgical History:   Procedure Laterality Date   • LAMINOTOMY     • TONSILLECTOMY         Family History   Problem Relation Age of Onset   • Diabetes Mother    • Heart Disease Father        Social History     Social History   • Marital status: Single     Spouse name: N/A   • Number of children: N/A   • Years of education: N/A     Occupational History   • Not on file.     Social History Main Topics   • Smoking status: Current Every Day Smoker     Packs/day: 0.50     Years: 35.00     Types: Cigarettes     Last attempt to quit: 6/1/2017   • Smokeless tobacco: Never Used   • Alcohol use Yes      Comment: 12 beers/day   • Drug use: No   • Sexual activity: Not on file     Other Topics Concern   • Not on file     Social History Narrative   • No narrative on file       Allergies as of 11/07/2018 - Reviewed 11/07/2018   Allergen Reaction Noted   • Codeine Hives 01/26/2016   • Pcn [penicillins] Hives 01/26/2016        @Vital signs  "for this encounter:  Vitals:    11/07/18 1019 11/07/18 1020   Height: 1.778 m (5' 10\")    Weight: 86.2 kg (190 lb)    Weight % change since last entry.: 0 %    BP: 148/80    Pulse: 88    BMI (Calculated): 27.26    Resp: 16    Temp: 37.1 °C (98.8 °F)    TempSrc: Temporal    O2 sat % room air:  (!) 91 %       Current medications as of today   Current Outpatient Prescriptions   Medication Sig Dispense Refill   • Fluticasone Furoate-Vilanterol (BREO ELLIPTA) 200-25 MCG/INH AEROSOL POWDER, BREATH ACTIVATED Inhale 1 Puff by mouth every day. Rinse mouth after use. 2 Each 0   • Tiotropium Bromide Monohydrate (SPIRIVA RESPIMAT) 2.5 MCG/ACT Aero Soln Inhale 2 Puffs by mouth every day. 2 Inhaler 0   • [facility] ASPIRIN ADULT LOW DOSE 81 MG Chew Tab chewable tablet Take 81 mg by mouth every day.  0   • diltiazem (CARDIZEM SR) 120 MG SR capsule Take 120 mg by mouth every day.  0   • VENTOLIN  (90 Base) MCG/ACT Aero Soln inhalation aerosol INHALE 1-2 PUFFS EVERY 4-6 HOURS AS NEEDED AND AS DIRECTED.  3   • albuterol (PROVENTIL) 2.5mg/3ml Nebu Soln solution for nebulization USE 1 UNIT DOSE IN NEBULIZER EVERY 4 HOURS AS NEEDED.  11   • Multiple Vitamins-Minerals (CENTRUM SILVER 50+MEN) Tab Take 1 tablet by mouth every day.     • bumetanide (BUMEX) 1 MG Tab Take 1 mg by mouth every day.     • folic acid (FOLVITE) 800 MCG tablet Take 800 mcg by mouth every day.     • acetaminophen 650 MG Tab Take 650 mg by mouth every 6 hours as needed (Mild Pain; (Pain scale 1-3); Temp greater than 100.5 F). 30 Tab 0   • calcium carbonate (TUMS) 500 MG Chew Tab Take 1 Tab by mouth every four hours as needed (indigestion). 30 Tab 3   • heparin 5000 UNIT/ML Solution Inject 1 mL as instructed every 8 hours.  0   • docusate sodium 100mg/10mL (COLACE) 150 MG/15ML Liquid Take 10 mL by mouth every morning. 300 mL    • enalapril (VASOTEC) 10 MG Tab Take 10 mg by mouth every day.     • Glucosamine-Chondroit-Vit C-Mn (GLUCOSAMINE CHONDR 1500 COMPLX PO) Take 2 " Tabs by mouth every day.       No current facility-administered medications for this visit.          Physical Exam:   Gen:           Alert and oriented, No apparent distress. Mood and affect appropriate, normal interaction with examiner.  Eyes:          PERRL, EOM intact, sclere white, conjunctive moist.  Ears:          Not examined.   Hearing:     Grossly intact.  Nose:          Normal, no lesions or deformities.  Dentition:    Poor dentition.  Oropharynx:   Tongue normal, posterior pharynx without erythema or exudate.  Mallampati Classification: 2/3  Neck:        Supple, trachea midline, no masses.  Respiratory Effort: No intercostal retractions or use of accessory muscles.   Lung Auscultation:      Diminished and Wheezing t/o; no rales.  CV:            Regular rate and rhythm. No murmurs, rubs or gallops.  Abd:           Not examined.   Lymphadenopathy: Not examined.  Gait and Station: In wheelchair.  Digits and Nails: No clubbing, cyanosis, petechiae, or nodes.   Cranial Nerves: II-XII grossly intact.  Skin:        No rashes, lesions or ulcers noted.               Ext:           No cyanosis or edema.      Assessment:  1. Chronic respiratory failure with hypoxia (HCC)     2. Centrilobular emphysema (HCC)     3. Current smoker     4. Pulmonary nodules     5. BMI 27.0-27.9,adult         Immunizations:    Flu:declines  Pneumovax 23:declines  Prevnar 13:declines    Plan:  Patient was seen for 40 minutes, more than 50% of time spent in face to face review, counseling, and arranging future evaluation and follow up of medical conditions and care related to COPD, hypoxia, bronchodilator use, medication use, smoking and alcohol cessation. Patient is clinically stable and will proceed with following plan. Answered all patient questions to their satisfaction.    1.  Continue inhaler regimen.  Sample sent for Breo.  Patient notes difficulty obtaining samples, advised to contact our office.  Rx given for emergency doxycycline  and prednisone taper.  Patient will take these when he travels.  2.  Continue oxygen 3 L on exertion and at night.  Reviewed appropriate use with patient and wife.  3.  Discussed respiratory hygiene.  4.  Patient declines vaccinations.  5.  Strongly encourage alcohol and smoking cessation.  6.  Encourage routine walking.  7.  CT scan of chest due June 2019 due to current smoking status.  7.  Follow-up in 4 months to check symptoms, sooner if needed.  Airline form provided for patient due to upcoming travels to [place] in February 2019.  Advised patient to call office if he needs any further orders for his DME in order to take a portable oxygen concentrator.      Please note that this dictation was created using voice recognition software. I have made every reasonable attempt to correct obvious errors, but it is possible there are errors of grammar and possibly content that I did not discover before finalizing the note.        Electronically signed by JOANIE Martinez  on 11/07/18    Agusto Bhatt M.D.  [address] [facility] [address]  [id number]  [place] [id number]  VIA Facsimile: [phone]

## 2018-11-07 NOTE — LETTER
JOANIE Martinez  [facility] Pulmonary Medicine   [address] [place] [address] [place] [address]  Phone: [phone] - Fax: [phone]           Encounter Date: 11/7/2018  Provider: JOANIE Martinez  Location of Care: [facility] PULMONARY MEDICINE      Patient:   Parveen Blanc   MR Number: [id number]   YOB: 1950     PROGRESS NOTE:  Chief Complaint   Patient presents with   • COPD       HPI:  Parveen Blanc is a 68 y.o. year old male here today for follow-up on severe COPD with chronic respiratory failure and pulmonary nodules.     Current smoker 1 pack daily, 17.5PYH. He was evaluated through [facility] in 2012 for Disability evaluation at which time pulmonary function testing confirmed severe COPD, FEV1 1 L.  Updated PFT 4/11/2018 indicates FEV1 1.04 L or 32% predicted, FEV1/FVC ratio 37, and a DLCO 60% predicted. He has had progressively worsening exertional dyspnea over the past years, and is now symptomatic with ADLs. He is sedentary most of the day due to his breathing. He is in wheelchair today. He remains on Breo 200mcg 1 puff Qd and Spiriva Respimat 2.5mcg 2 puffs QD with significant benefit. He uses SOFIA daily but 1-4x's daily depending on activity. He utilizes nebulizer qhs. He relies on samples at the moment. He has no prescription coverage. He has not met criteria for assistance programs. He remains on 3L O2 24/7. He is not motivated to quit smoking/drinking. He notes slightly worsening dyspnea since last OV. He notes chronic cough with clear phlegm production. He has intermittent wheezing. No significant chest pain or tightness.   He has a history of alcohol abuse, and continues to drink 10 beers daily.    Last hospitalized 1/2018 at [facility] for AECOPD and suspected pneumonia with sepsis. Atrial fibrillation was noted on monitoring but rate controlled with diltiazem. He declined use of anticoagulation. He is taking an 81mg ASA daily. ECHO indicated  normal LVEF, Grade I Diastolic dysfunction and LVH. Troponin negative.      CT scan 6/13/18 indicates:   1.  Stable 5 mm left lower lobe pulmonary nodule  2.  Emphysema  3.  Mild pulmonary parenchymal scarring  4.  Aortic and coronary atherosclerotic plaque  5.  Pulmonary artery hypertension  6.  Cirrhosis and portal hypertension  7.  Cholelithiasis     We will repeat CT scan in 1 year due to stability - due 6/2019. We may want to pursue HRCT, but patient is not interested in ILD work up due to possible cost.   We reviewed smoking and alcohol cessation in depth.      ROS: As per HPI and otherwise negative if not stated.    Past Medical History:   Diagnosis Date   • Asthma    • Back pain    • Bronchitis    • Chickenpox    • Chronic obstructive pulmonary disease (HCC)    • Depression    • Nepali measles    • Hypertension    • Influenza    • Mumps    • Nasal drainage    • Pneumonia    • Pulmonary emphysema (HCC)    • Pulmonary hypertension (HCC)    • Restless leg syndrome    • Tonsillitis        Past Surgical History:   Procedure Laterality Date   • LAMINOTOMY     • TONSILLECTOMY         Family History   Problem Relation Age of Onset   • Diabetes Mother    • Heart Disease Father        Social History     Social History   • Marital status: Single     Spouse name: N/A   • Number of children: N/A   • Years of education: N/A     Occupational History   • Not on file.     Social History Main Topics   • Smoking status: Current Every Day Smoker     Packs/day: 0.50     Years: 35.00     Types: Cigarettes     Last attempt to quit: 6/1/2017   • Smokeless tobacco: Never Used   • Alcohol use Yes      Comment: 12 beers/day   • Drug use: No   • Sexual activity: Not on file     Other Topics Concern   • Not on file     Social History Narrative   • No narrative on file       Allergies as of 11/07/2018 - Reviewed 11/07/2018   Allergen Reaction Noted   • Codeine Hives 01/26/2016   • Pcn [penicillins] Hives 01/26/2016        @Vital signs  "for this encounter:  Vitals:    11/07/18 1019 11/07/18 1020   Height: 1.778 m (5' 10\")    Weight: 86.2 kg (190 lb)    Weight % change since last entry.: 0 %    BP: 148/80    Pulse: 88    BMI (Calculated): 27.26    Resp: 16    Temp: 37.1 °C (98.8 °F)    TempSrc: Temporal    O2 sat % room air:  (!) 91 %       Current medications as of today   Current Outpatient Prescriptions   Medication Sig Dispense Refill   • Fluticasone Furoate-Vilanterol (BREO ELLIPTA) 200-25 MCG/INH AEROSOL POWDER, BREATH ACTIVATED Inhale 1 Puff by mouth every day. Rinse mouth after use. 2 Each 0   • Tiotropium Bromide Monohydrate (SPIRIVA RESPIMAT) 2.5 MCG/ACT Aero Soln Inhale 2 Puffs by mouth every day. 2 Inhaler 0   • [facility] ASPIRIN ADULT LOW DOSE 81 MG Chew Tab chewable tablet Take 81 mg by mouth every day.  0   • diltiazem (CARDIZEM SR) 120 MG SR capsule Take 120 mg by mouth every day.  0   • VENTOLIN  (90 Base) MCG/ACT Aero Soln inhalation aerosol INHALE 1-2 PUFFS EVERY 4-6 HOURS AS NEEDED AND AS DIRECTED.  3   • albuterol (PROVENTIL) 2.5mg/3ml Nebu Soln solution for nebulization USE 1 UNIT DOSE IN NEBULIZER EVERY 4 HOURS AS NEEDED.  11   • Multiple Vitamins-Minerals (CENTRUM SILVER 50+MEN) Tab Take 1 tablet by mouth every day.     • bumetanide (BUMEX) 1 MG Tab Take 1 mg by mouth every day.     • folic acid (FOLVITE) 800 MCG tablet Take 800 mcg by mouth every day.     • acetaminophen 650 MG Tab Take 650 mg by mouth every 6 hours as needed (Mild Pain; (Pain scale 1-3); Temp greater than 100.5 F). 30 Tab 0   • calcium carbonate (TUMS) 500 MG Chew Tab Take 1 Tab by mouth every four hours as needed (indigestion). 30 Tab 3   • heparin 5000 UNIT/ML Solution Inject 1 mL as instructed every 8 hours.  0   • docusate sodium 100mg/10mL (COLACE) 150 MG/15ML Liquid Take 10 mL by mouth every morning. 300 mL    • enalapril (VASOTEC) 10 MG Tab Take 10 mg by mouth every day.     • Glucosamine-Chondroit-Vit C-Mn (GLUCOSAMINE CHONDR 1500 COMPLX PO) Take 2 " Tabs by mouth every day.       No current facility-administered medications for this visit.          Physical Exam:   Gen:           Alert and oriented, No apparent distress. Mood and affect appropriate, normal interaction with examiner.  Eyes:          PERRL, EOM intact, sclere white, conjunctive moist.  Ears:          Not examined.   Hearing:     Grossly intact.  Nose:          Normal, no lesions or deformities.  Dentition:    Poor dentition.  Oropharynx:   Tongue normal, posterior pharynx without erythema or exudate.  Mallampati Classification: 2/3  Neck:        Supple, trachea midline, no masses.  Respiratory Effort: No intercostal retractions or use of accessory muscles.   Lung Auscultation:      Diminished and Wheezing t/o; no rales.  CV:            Regular rate and rhythm. No murmurs, rubs or gallops.  Abd:           Not examined.   Lymphadenopathy: Not examined.  Gait and Station: In wheelchair.  Digits and Nails: No clubbing, cyanosis, petechiae, or nodes.   Cranial Nerves: II-XII grossly intact.  Skin:        No rashes, lesions or ulcers noted.               Ext:           No cyanosis or edema.      Assessment:  1. Chronic respiratory failure with hypoxia (HCC)     2. Centrilobular emphysema (HCC)     3. Current smoker     4. Pulmonary nodules     5. BMI 27.0-27.9,adult         Immunizations:    Flu:declines  Pneumovax 23:declines  Prevnar 13:declines    Plan:  Patient was seen for 40 minutes, more than 50% of time spent in face to face review, counseling, and arranging future evaluation and follow up of medical conditions and care related to COPD, hypoxia, bronchodilator use, medication use, smoking and alcohol cessation. Patient is clinically stable and will proceed with following plan. Answered all patient questions to their satisfaction.    1.  Continue inhaler regimen.  Sample sent for Breo.  Patient notes difficulty obtaining samples, advised to contact our office.  Rx given for emergency doxycycline  and prednisone taper.  Patient will take these when he travels.  2.  Continue oxygen 3 L on exertion and at night.  Reviewed appropriate use with patient and wife.  3.  Discussed respiratory hygiene.  4.  Patient declines vaccinations.  5.  Strongly encourage alcohol and smoking cessation.  6.  Encourage routine walking.  7.  CT scan of chest due June 2019 due to current smoking status.  7.  Follow-up in 4 months to check symptoms, sooner if needed.  Airline form provided for patient due to upcoming travels to [place] in February 2019.  Advised patient to call office if he needs any further orders for his DME in order to take a portable oxygen concentrator.      Please note that this dictation was created using voice recognition software. I have made every reasonable attempt to correct obvious errors, but it is possible there are errors of grammar and possibly content that I did not discover before finalizing the note.        Electronically signed by JOANIE Martinez  on 11/07/18    Agusto Bhatt M.D.  [address] [facility] [address]  [address]  [place] [id number]  VIA Facsimile: [phone]

## 2018-11-07 NOTE — PROGRESS NOTES
Chief Complaint   Patient presents with   • COPD       HPI:  Parveen Blanc is a 68 y.o. year old male here today for follow-up on severe COPD with chronic respiratory failure and pulmonary nodules.     Current smoker 1 pack daily, 17.5PYH. He was evaluated through [facility] in 2012 for Disability evaluation at which time pulmonary function testing confirmed severe COPD, FEV1 1 L.  Updated PFT 4/11/2018 indicates FEV1 1.04 L or 32% predicted, FEV1/FVC ratio 37, and a DLCO 60% predicted. He has had progressively worsening exertional dyspnea over the past years, and is now symptomatic with ADLs. He is sedentary most of the day due to his breathing. He is in wheelchair today. He remains on Breo 200mcg 1 puff Qd and Spiriva Respimat 2.5mcg 2 puffs QD with significant benefit. He uses SOFIA daily but 1-4x's daily depending on activity. He utilizes nebulizer qhs. He relies on samples at the moment. He has no prescription coverage. He has not met criteria for assistance programs. He remains on 3L O2 24/7. He is not motivated to quit smoking/drinking. He notes slightly worsening dyspnea since last OV. He notes chronic cough with clear phlegm production. He has intermittent wheezing. No significant chest pain or tightness.   He has a history of alcohol abuse, and continues to drink 10 beers daily.    Last hospitalized 1/2018 at [facility] for AECOPD and suspected pneumonia with sepsis. Atrial fibrillation was noted on monitoring but rate controlled with diltiazem. He declined use of anticoagulation. He is taking an 81mg ASA daily. ECHO indicated normal LVEF, Grade I Diastolic dysfunction and LVH. Troponin negative.      CT scan 6/13/18 indicates:   1.  Stable 5 mm left lower lobe pulmonary nodule  2.  Emphysema  3.  Mild pulmonary parenchymal scarring  4.  Aortic and coronary atherosclerotic plaque  5.  Pulmonary artery hypertension  6.  Cirrhosis and portal hypertension  7.  Cholelithiasis     We will repeat CT scan in 1 year due to  "stability - due 6/2019. We may want to pursue HRCT, but patient is not interested in ILD work up due to possible cost.   We reviewed smoking and alcohol cessation in depth.      ROS: As per HPI and otherwise negative if not stated.    Past Medical History:   Diagnosis Date   • Asthma    • Back pain    • Bronchitis    • Chickenpox    • Chronic obstructive pulmonary disease (HCC)    • Depression    • Baham[place] measles    • Hypertension    • Influenza    • Mumps    • Nasal drainage    • Pneumonia    • Pulmonary emphysema (HCC)    • Pulmonary hypertension (HCC)    • Restless leg syndrome    • Tonsillitis        Past Surgical History:   Procedure Laterality Date   • LAMINOTOMY     • TONSILLECTOMY         Family History   Problem Relation Age of Onset   • Diabetes Mother    • Heart Disease Father        Social History     Social History   • Marital status: Single     Spouse name: N/A   • Number of children: N/A   • Years of education: N/A     Occupational History   • Not on file.     Social History Main Topics   • Smoking status: Current Every Day Smoker     Packs/day: 0.50     Years: 35.00     Types: Cigarettes     Last attempt to quit: 6/1/2017   • Smokeless tobacco: Never Used   • Alcohol use Yes      Comment: 12 beers/day   • Drug use: No   • Sexual activity: Not on file     Other Topics Concern   • Not on file     Social History Narrative   • No narrative on file       Allergies as of 11/07/2018 - Reviewed 11/07/2018   Allergen Reaction Noted   • Codeine Hives 01/26/2016   • Pcn [penicillins] Hives 01/26/2016        @Vital signs for this encounter:  Vitals:    11/07/18 1019 11/07/18 1020   Height: 1.778 m (5' 10\")    Weight: 86.2 kg (190 lb)    Weight % change since last entry.: 0 %    BP: 148/80    Pulse: 88    BMI (Calculated): 27.26    Resp: 16    Temp: 37.1 °C (98.8 °F)    TempSrc: Temporal    O2 sat % room air:  (!) 91 %       Current medications as of today   Current Outpatient Prescriptions   Medication Sig " Dispense Refill   • Fluticasone Furoate-Vilanterol (BREO ELLIPTA) 200-25 MCG/INH AEROSOL POWDER, BREATH ACTIVATED Inhale 1 Puff by mouth every day. Rinse mouth after use. 2 Each 0   • Tiotropium Bromide Monohydrate (SPIRIVA RESPIMAT) 2.5 MCG/ACT Aero Soln Inhale 2 Puffs by mouth every day. 2 Inhaler 0   • [facility] ASPIRIN ADULT LOW DOSE 81 MG Chew Tab chewable tablet Take 81 mg by mouth every day.  0   • diltiazem (CARDIZEM SR) 120 MG SR capsule Take 120 mg by mouth every day.  0   • VENTOLIN  (90 Base) MCG/ACT Aero Soln inhalation aerosol INHALE 1-2 PUFFS EVERY 4-6 HOURS AS NEEDED AND AS DIRECTED.  3   • albuterol (PROVENTIL) 2.5mg/3ml Nebu Soln solution for nebulization USE 1 UNIT DOSE IN NEBULIZER EVERY 4 HOURS AS NEEDED.  11   • Multiple Vitamins-Minerals (CENTRUM SILVER 50+MEN) Tab Take 1 tablet by mouth every day.     • bumetanide (BUMEX) 1 MG Tab Take 1 mg by mouth every day.     • folic acid (FOLVITE) 800 MCG tablet Take 800 mcg by mouth every day.     • acetaminophen 650 MG Tab Take 650 mg by mouth every 6 hours as needed (Mild Pain; (Pain scale 1-3); Temp greater than 100.5 F). 30 Tab 0   • calcium carbonate (TUMS) 500 MG Chew Tab Take 1 Tab by mouth every four hours as needed (indigestion). 30 Tab 3   • heparin 5000 UNIT/ML Solution Inject 1 mL as instructed every 8 hours.  0   • docusate sodium 100mg/10mL (COLACE) 150 MG/15ML Liquid Take 10 mL by mouth every morning. 300 mL    • enalapril (VASOTEC) 10 MG Tab Take 10 mg by mouth every day.     • Glucosamine-Chondroit-Vit C-Mn (GLUCOSAMINE CHONDR 1500 COMPLX PO) Take 2 Tabs by mouth every day.       No current facility-administered medications for this visit.          Physical Exam:   Gen:           Alert and oriented, No apparent distress. Mood and affect appropriate, normal interaction with examiner.  Eyes:          PERRL, EOM intact, sclere white, conjunctive moist.  Ears:          Not examined.   Hearing:     Grossly intact.  Nose:          Normal,  no lesions or deformities.  Dentition:    Poor dentition.  Oropharynx:   Tongue normal, posterior pharynx without erythema or exudate.  Mallampati Classification: 2/3  Neck:        Supple, trachea midline, no masses.  Respiratory Effort: No intercostal retractions or use of accessory muscles.   Lung Auscultation:      Diminished and Wheezing t/o; no rales.  CV:            Regular rate and rhythm. No murmurs, rubs or gallops.  Abd:           Not examined.   Lymphadenopathy: Not examined.  Gait and Station: In wheelchair.  Digits and Nails: No clubbing, cyanosis, petechiae, or nodes.   Cranial Nerves: II-XII grossly intact.  Skin:        No rashes, lesions or ulcers noted.               Ext:           No cyanosis or edema.      Assessment:  1. Chronic respiratory failure with hypoxia (HCC)     2. Centrilobular emphysema (HCC)     3. Current smoker     4. Pulmonary nodules     5. BMI 27.0-27.9,adult         Immunizations:    Flu:declines  Pneumovax 23:declines  Prevnar 13:declines    Plan:  Patient was seen for 40 minutes, more than 50% of time spent in face to face review, counseling, and arranging future evaluation and follow up of medical conditions and care related to COPD, hypoxia, bronchodilator use, medication use, smoking and alcohol cessation. Patient is clinically stable and will proceed with following plan. Answered all patient questions to their satisfaction.    1.  Continue inhaler regimen.  Sample sent for Breo.  Patient notes difficulty obtaining samples, advised to contact our office.  Rx given for emergency doxycycline and prednisone taper.  Patient will take these when he travels.  2.  Continue oxygen 3 L on exertion and at night.  Reviewed appropriate use with patient and wife.  3.  Discussed respiratory hygiene.  4.  Patient declines vaccinations.  5.  Strongly encourage alcohol and smoking cessation.  6.  Encourage routine walking.  7.  CT scan of chest due June 2019 due to current smoking  status.  7.  Follow-up in 4 months to check symptoms, sooner if needed.  Airline form provided for patient due to upcoming travels to [place] in February 2019.  Advised patient to call office if he needs any further orders for his DME in order to take a portable oxygen concentrator.      Please note that this dictation was created using voice recognition software. I have made every reasonable attempt to correct obvious errors, but it is possible there are errors of grammar and possibly content that I did not discover before finalizing the note.

## 2019-03-06 ENCOUNTER — OFFICE VISIT (OUTPATIENT)
Dept: PULMONOLOGY | Facility: HOSPICE | Age: 69
End: 2019-03-06
Payer: MEDICARE

## 2019-03-06 VITALS
OXYGEN SATURATION: 88 % | DIASTOLIC BLOOD PRESSURE: 82 MMHG | RESPIRATION RATE: 16 BRPM | HEART RATE: 91 BPM | WEIGHT: 177.6 LBS | TEMPERATURE: 97.9 F | BODY MASS INDEX: 25.43 KG/M2 | HEIGHT: 70 IN | SYSTOLIC BLOOD PRESSURE: 142 MMHG

## 2019-03-06 DIAGNOSIS — I10 HYPERTENSION, UNSPECIFIED TYPE: ICD-10-CM

## 2019-03-06 DIAGNOSIS — R91.8 PULMONARY NODULES: Chronic | ICD-10-CM

## 2019-03-06 DIAGNOSIS — J43.2 CENTRILOBULAR EMPHYSEMA (HCC): Chronic | ICD-10-CM

## 2019-03-06 DIAGNOSIS — F17.200 CURRENT SMOKER: Chronic | ICD-10-CM

## 2019-03-06 DIAGNOSIS — J96.11 CHRONIC RESPIRATORY FAILURE WITH HYPOXIA (HCC): Chronic | ICD-10-CM

## 2019-03-06 PROCEDURE — 99214 OFFICE O/P EST MOD 30 MIN: CPT | Performed by: NURSE PRACTITIONER

## 2019-03-06 RX ORDER — FORMOTEROL FUMARATE DIHYDRATE 20 UG/2ML
20 SOLUTION RESPIRATORY (INHALATION) 2 TIMES DAILY
Qty: 120 ML | Refills: 11 | Status: SHIPPED
Start: 2019-03-06 | End: 2019-05-24

## 2019-03-06 RX ORDER — BUDESONIDE 0.5 MG/2ML
500 INHALANT ORAL 2 TIMES DAILY
Qty: 60 ML | Refills: 11 | Status: SHIPPED
Start: 2019-03-06 | End: 2019-05-24

## 2019-03-06 NOTE — LETTER
JOANIE Martinez  [facility]   [address] [place] [address] [place] [address]  Phone: [phone] - Fax: [phone]           Encounter Date: 3/6/2019  Provider: JOANIE Martinez  Location of Care: [facility] PULMON[facility]      Patient:   Parveen Blanc   MR Number: [id number]   YOB: 1950     PROGRESS NOTE:  Chief Complaint   Patient presents with   • Follow-Up     4months       HPI:  Parveen Blanc is a 68 y.o. year old male here today for follow-up on severe COPD with chronic respiratory failure and pulmonary nodules.      Current smoker 1 pack daily, 17.5PYH. He was evaluated through [facility] in 2012 for Disability evaluation at which time pulmonary function testing confirmed severe COPD, FEV1 1 L.  Updated PFT 4/11/2018 indicates FEV1 1.04 L or 32% predicted, FEV1/FVC ratio 37, and a DLCO 60% predicted. He has had progressively worsening exertional dyspnea over the past years, and is now symptomatic with ADLs. He is sedentary most of the day due to his breathing. He is in wheelchair today. He remains on Breo 200mcg 1 puff Qd and Spiriva Respimat 2.5mcg 2 puffs QD with significant benefit. He uses SOFIA daily but 1-4x's daily depending on activity. He utilizes nebulizer qhs. He relies on samples at the moment. He has no prescription coverage. He has not met criteria for assistance programs.   He remains on 3L O2 24/7. He is not motivated to quit smoking/drinking.   He has a history of alcohol abuse, and continues to drink 10 beers daily.     Last hospitalized 1/2018 at [facility] for AECOPD and suspected pneumonia with sepsis. Atrial fibrillation was noted on monitoring but rate controlled with diltiazem. He declined use of anticoagulation. He is taking an 81mg ASA daily. ECHO indicated normal LVEF, Grade I Diastolic dysfunction and LVH. Troponin negative.      CT scan 6/13/18 indicates:   1.  Stable 5 mm left lower lobe pulmonary  nodule  2.  Emphysema  3.  Mild pulmonary parenchymal scarring  4.  Aortic and coronary atherosclerotic plaque  5.  Pulmonary artery hypertension  6.  Cirrhosis and portal hypertension  7.  Cholelithiasis     We will repeat CT scan in 1 year due to stability - due 6/2019. We may want to pursue HRCT, but patient is not interested in ILD work up due to possible cost.   We reviewed smoking and alcohol cessation in depth    He visited [place] with POC and did not tolerate humidity since last OV. Today he notes dyspnea to be stable but thick cough with phlegm. He has wheezing intermittently. No chest tightness. He gets dyspneic with minimal exertion. He is quite sedentary and sits most of the time. Mucinex caused headache so he stopped using it. He is not always compliant with daytime use of oxygen. BMI 25. He has lost 13lbs since last OV.        ROS: As per HPI and otherwise negative if not stated.    Past Medical History:   Diagnosis Date   • Asthma    • Back pain    • Bronchitis    • Chickenpox    • Chronic obstructive pulmonary disease (HCC)    • Depression    • Ethiopian measles    • Hypertension    • Influenza    • Mumps    • Nasal drainage    • Pneumonia    • Pulmonary emphysema (HCC)    • Pulmonary hypertension (HCC)    • Restless leg syndrome    • Tonsillitis        Past Surgical History:   Procedure Laterality Date   • LAMINOTOMY     • TONSILLECTOMY         Family History   Problem Relation Age of Onset   • Diabetes Mother    • Heart Disease Father        Social History     Social History   • Marital status: Single     Spouse name: N/A   • Number of children: N/A   • Years of education: N/A     Occupational History   • Not on file.     Social History Main Topics   • Smoking status: Current Every Day Smoker     Packs/day: 0.50     Years: 35.00     Types: Cigarettes     Last attempt to quit: 6/1/2017   • Smokeless tobacco: Never Used   • Alcohol use 6.0 oz/week     10 Cans of beer per week   • Drug use: No   •  "Sexual activity: Not on file     Other Topics Concern   • Not on file     Social History Narrative   • No narrative on file       Allergies as of 03/06/2019 - Reviewed 03/06/2019   Allergen Reaction Noted   • Codeine Hives 01/26/2016   • Pcn [penicillins] Hives 01/26/2016        @Vital signs for this encounter:  Vitals:    03/06/19 1049   Height: 1.778 m (5' 10\")   Weight: 80.6 kg (177 lb 9.6 oz)   Weight % change since last entry.: 0 %   BP: 142/82   Pulse: 91   BMI (Calculated): 25.48   Resp: 16   Temp: 36.6 °C (97.9 °F)   TempSrc: Temporal   O2 sat % room air: (!) 88 %       Current medications as of today   Current Outpatient Prescriptions   Medication Sig Dispense Refill   • Tiotropium Bromide Monohydrate (SPIRIVA RESPIMAT) 2.5 MCG/ACT Aero Soln Inhale 2 Puffs by mouth every day. 2 Inhaler 0   • VENTOLIN  (90 Base) MCG/ACT Aero Soln inhalation aerosol INHALE 1-2 PUFFS EVERY 4-6 HOURS AS NEEDED AND AS DIRECTED.  3   • albuterol (PROVENTIL) 2.5mg/3ml Nebu Soln solution for nebulization USE 1 UNIT DOSE IN NEBULIZER EVERY 4 HOURS AS NEEDED.  11   • Multiple Vitamins-Minerals (CENTRUM SILVER 50+MEN) Tab Take 1 tablet by mouth every day.     • folic acid (FOLVITE) 800 MCG tablet Take 800 mcg by mouth every day.     • enalapril (VASOTEC) 10 MG Tab Take 10 mg by mouth every day.     • Glucosamine-Chondroit-Vit C-Mn (GLUCOSAMINE CHONDR 1500 COMPLX PO) Take 2 Tabs by mouth every day.     • Fluticasone Furoate-Vilanterol (BREO ELLIPTA) 200-25 MCG/INH AEROSOL POWDER, BREATH ACTIVATED Inhale 1 Puff by mouth every day. Rinse mouth after use. (Patient not taking: Reported on 3/6/2019) 2 Each 0   • calcium carbonate (TUMS) 500 MG Chew Tab Take 1 Tab by mouth every four hours as needed (indigestion). 30 Tab 3     No current facility-administered medications for this visit.          Physical Exam:   Gen:           Alert and oriented, No apparent distress. Mood and affect appropriate, normal interaction with " examiner.  Eyes:          PERRL, EOM intact, sclere white, conjunctive moist.  Ears:          Not examined.   Hearing:     Grossly intact.  Nose:          Normal, no lesions or deformities. Blue tinge to tip of nose.  Dentition:    Good dentition.  Oropharynx:   Tongue normal, posterior pharynx without erythema or exudate.  Mallampati Classification: 2/3  Neck:        Supple, trachea midline, no masses.  Respiratory Effort: No intercostal retractions or use of accessory muscles.   Lung Auscultation:      Diminished t/o with wheezing to right lung t/o; no rales.  CV:            Regular rate and rhythm. No murmurs, rubs or gallops.  Abd:           Not examined.   Lymphadenopathy: Not examined.  Gait and Station: Normal.  Digits and Nails: No clubbing, cyanosis, petechiae, or nodes.   Cranial Nerves: II-XII grossly intact.  Skin:        No rashes, lesions or ulcers noted.               Ext:           RLE edema 1+, LLE normal.      Assessment:  1. Centrilobular emphysema (HCC)     2. Chronic respiratory failure with hypoxia (HCC)     3. Pulmonary nodules     4. Current smoker     5. BMI 25.0-25.9,adult         Immunizations:    Flu:declines  Pneumovax 23:declines  Prevnar 13:declines    Plan:  1.  Patient has no prescription insurance.  He is currently using Breo 200 mcg 1 puff daily and Spiriva Respimat 2.5 mcg 2 puffs once daily.  He is relying on samples.  Her sample pharmacy is currently out of Breo.  We will switch him to Trelegy 1 puff daily to take the place of these.  He may call office in the future to request more samples for Brio to use with his Spiriva that he has remaining at home.  We will also have him check the cost of nebulized Perforomist and Pulmicort as another option.  Rx provided.  2.  Continue oxygen 24/7.  3.  Discussed respiratory hygiene.  4.  Encourage routine walking.  5.  Complete CT scan of the chest June 2019.  6.  Patient declines vaccinations.  7.  For today's symptoms, start prednisone  20mg x 4 days. Patient has doxycycline and prednisone taper on hand.   8.  Follow-up in 4 months to check symptoms with CT scan of chest, sooner if needed.    Please note that this dictation was created using voice recognition software. I have made every reasonable attempt to correct obvious errors, but it is possible there are errors of grammar and possibly content that I did not discover before finalizing the note.        Electronically signed by JOANIE Martinez  on 03/06/19    Agusto Bhatt M.D.  [address] [place] [address] [id number]  [id number]  [facility] [id number]  VIA Facsimile: [phone]

## 2019-03-06 NOTE — PROGRESS NOTES
Chief Complaint   Patient presents with   • Follow-Up     4months       HPI:  Parveen Blanc is a 68 y.o. year old male here today for follow-up on severe COPD with chronic respiratory failure and pulmonary nodules.      Current smoker 1 pack daily, 17.5PYH. He was evaluated through [facility] in 2012 for Disability evaluation at which time pulmonary function testing confirmed severe COPD, FEV1 1 L.  Updated PFT 4/11/2018 indicates FEV1 1.04 L or 32% predicted, FEV1/FVC ratio 37, and a DLCO 60% predicted. He has had progressively worsening exertional dyspnea over the past years, and is now symptomatic with ADLs. He is sedentary most of the day due to his breathing. He is in wheelchair today. He remains on Breo 200mcg 1 puff Qd and Spiriva Respimat 2.5mcg 2 puffs QD with significant benefit. He uses SOFIA daily but 1-4x's daily depending on activity. He utilizes nebulizer qhs. He relies on samples at the moment. He has no prescription coverage. He has not met criteria for assistance programs.   He remains on 3L O2 24/7. He is not motivated to quit smoking/drinking.   He has a history of alcohol abuse, and continues to drink 10 beers daily.     Last hospitalized 1/2018 at [facility] for AECOPD and suspected pneumonia with sepsis. Atrial fibrillation was noted on monitoring but rate controlled with diltiazem. He declined use of anticoagulation. He is taking an 81mg ASA daily. ECHO indicated normal LVEF, Grade I Diastolic dysfunction and LVH. Troponin negative.      CT scan 6/13/18 indicates:   1.  Stable 5 mm left lower lobe pulmonary nodule  2.  Emphysema  3.  Mild pulmonary parenchymal scarring  4.  Aortic and coronary atherosclerotic plaque  5.  Pulmonary artery hypertension  6.  Cirrhosis and portal hypertension  7.  Cholelithiasis     We will repeat CT scan in 1 year due to stability - due 6/2019. We may want to pursue HRCT, but patient is not interested in ILD work up due to possible cost.   We reviewed smoking and alcohol  "cessation in depth    He visited [place] with POC and did not tolerate humidity since last OV. Today he notes dyspnea to be stable but thick cough with phlegm. He has wheezing intermittently. No chest tightness. He gets dyspneic with minimal exertion. He is quite sedentary and sits most of the time. Mucinex caused headache so he stopped using it. He is not always compliant with daytime use of oxygen. BMI 25. He has lost 13lbs since last OV.        ROS: As per HPI and otherwise negative if not stated.    Past Medical History:   Diagnosis Date   • Asthma    • Back pain    • Bronchitis    • Chickenpox    • Chronic obstructive pulmonary disease (HCC)    • Depression    • [place] measles    • Hypertension    • Influenza    • Mumps    • Nasal drainage    • Pneumonia    • Pulmonary emphysema (HCC)    • Pulmonary hypertension (HCC)    • Restless leg syndrome    • Tonsillitis        Past Surgical History:   Procedure Laterality Date   • LAMINOTOMY     • TONSILLECTOMY         Family History   Problem Relation Age of Onset   • Diabetes Mother    • Heart Disease Father        Social History     Social History   • Marital status: Single     Spouse name: N/A   • Number of children: N/A   • Years of education: N/A     Occupational History   • Not on file.     Social History Main Topics   • Smoking status: Current Every Day Smoker     Packs/day: 0.50     Years: 35.00     Types: Cigarettes     Last attempt to quit: 6/1/2017   • Smokeless tobacco: Never Used   • Alcohol use 6.0 oz/week     10 Cans of beer per week   • Drug use: No   • Sexual activity: Not on file     Other Topics Concern   • Not on file     Social History Narrative   • No narrative on file       Allergies as of 03/06/2019 - Reviewed 03/06/2019   Allergen Reaction Noted   • Codeine Hives 01/26/2016   • Pcn [penicillins] Hives 01/26/2016        @Vital signs for this encounter:  Vitals:    03/06/19 1049   Height: 1.778 m (5' 10\")   Weight: 80.6 kg (177 lb 9.6 oz) "   Weight % change since last entry.: 0 %   BP: 142/82   Pulse: 91   BMI (Calculated): 25.48   Resp: 16   Temp: 36.6 °C (97.9 °F)   TempSrc: Temporal   O2 sat % room air: (!) 88 %       Current medications as of today   Current Outpatient Prescriptions   Medication Sig Dispense Refill   • Tiotropium Bromide Monohydrate (SPIRIVA RESPIMAT) 2.5 MCG/ACT Aero Soln Inhale 2 Puffs by mouth every day. 2 Inhaler 0   • VENTOLIN  (90 Base) MCG/ACT Aero Soln inhalation aerosol INHALE 1-2 PUFFS EVERY 4-6 HOURS AS NEEDED AND AS DIRECTED.  3   • albuterol (PROVENTIL) 2.5mg/3ml Nebu Soln solution for nebulization USE 1 UNIT DOSE IN NEBULIZER EVERY 4 HOURS AS NEEDED.  11   • Multiple Vitamins-Minerals (CENTRUM SILVER 50+MEN) Tab Take 1 tablet by mouth every day.     • folic acid (FOLVITE) 800 MCG tablet Take 800 mcg by mouth every day.     • enalapril (VASOTEC) 10 MG Tab Take 10 mg by mouth every day.     • Glucosamine-Chondroit-Vit C-Mn (GLUCOSAMINE CHONDR 1500 COMPLX PO) Take 2 Tabs by mouth every day.     • Fluticasone Furoate-Vilanterol (BREO ELLIPTA) 200-25 MCG/INH AEROSOL POWDER, BREATH ACTIVATED Inhale 1 Puff by mouth every day. Rinse mouth after use. (Patient not taking: Reported on 3/6/2019) 2 Each 0   • calcium carbonate (TUMS) 500 MG Chew Tab Take 1 Tab by mouth every four hours as needed (indigestion). 30 Tab 3     No current facility-administered medications for this visit.          Physical Exam:   Gen:           Alert and oriented, No apparent distress. Mood and affect appropriate, normal interaction with examiner.  Eyes:          PERRL, EOM intact, sclere white, conjunctive moist.  Ears:          Not examined.   Hearing:     Grossly intact.  Nose:          Normal, no lesions or deformities. Blue tinge to tip of nose.  Dentition:    Good dentition.  Oropharynx:   Tongue normal, posterior pharynx without erythema or exudate.  Mallampati Classification: 2/3  Neck:        Supple, trachea midline, no  masses.  Respiratory Effort: No intercostal retractions or use of accessory muscles.   Lung Auscultation:      Diminished t/o with wheezing to right lung t/o; no rales.  CV:            Regular rate and rhythm. No murmurs, rubs or gallops.  Abd:           Not examined.   Lymphadenopathy: Not examined.  Gait and Station: Normal.  Digits and Nails: No clubbing, cyanosis, petechiae, or nodes.   Cranial Nerves: II-XII grossly intact.  Skin:        No rashes, lesions or ulcers noted.               Ext:           RLE edema 1+, LLE normal.      Assessment:  1. Centrilobular emphysema (HCC)     2. Chronic respiratory failure with hypoxia (HCC)     3. Pulmonary nodules     4. Current smoker     5. BMI 25.0-25.9,adult         Immunizations:    Flu:declines  Pneumovax 23:declines  Prevnar 13:declines    Plan:  1.  Patient has no prescription insurance.  He is currently using Breo 200 mcg 1 puff daily and Spiriva Respimat 2.5 mcg 2 puffs once daily.  He is relying on samples.  Her sample pharmacy is currently out of Breo.  We will switch him to Trelegy 1 puff daily to take the place of these.  He may call office in the future to request more samples for Brio to use with his Spiriva that he has remaining at home.  We will also have him check the cost of nebulized Perforomist and Pulmicort as another option.  Rx provided.  2.  Continue oxygen 24/7.  3.  Discussed respiratory hygiene.  4.  Encourage routine walking.  5.  Complete CT scan of the chest June 2019.  6.  Patient declines vaccinations.  7.  For today's symptoms, start prednisone 20mg x 4 days. Patient has doxycycline and prednisone taper on hand.   8.  Follow-up in 4 months to check symptoms with CT scan of chest, sooner if needed.    Please note that this dictation was created using voice recognition software. I have made every reasonable attempt to correct obvious errors, but it is possible there are errors of grammar and possibly content that I did not discover before  finalizing the note.

## 2019-05-24 ENCOUNTER — TELEPHONE (OUTPATIENT)
Dept: PULMONOLOGY | Facility: HOSPICE | Age: 69
End: 2019-05-24

## 2019-05-24 ENCOUNTER — OFFICE VISIT (OUTPATIENT)
Dept: PULMONOLOGY | Facility: HOSPICE | Age: 69
End: 2019-05-24
Payer: MEDICARE

## 2019-05-24 VITALS
WEIGHT: 170 LBS | HEART RATE: 82 BPM | OXYGEN SATURATION: 97 % | DIASTOLIC BLOOD PRESSURE: 90 MMHG | HEIGHT: 70 IN | BODY MASS INDEX: 24.34 KG/M2 | RESPIRATION RATE: 16 BRPM | SYSTOLIC BLOOD PRESSURE: 140 MMHG

## 2019-05-24 DIAGNOSIS — F10.930 ALCOHOL WITHDRAWAL SYNDROME WITHOUT COMPLICATION (HCC): ICD-10-CM

## 2019-05-24 DIAGNOSIS — J43.2 CENTRILOBULAR EMPHYSEMA (HCC): Chronic | ICD-10-CM

## 2019-05-24 DIAGNOSIS — F17.200 CURRENT SMOKER: Chronic | ICD-10-CM

## 2019-05-24 DIAGNOSIS — I10 HYPERTENSION, UNSPECIFIED TYPE: ICD-10-CM

## 2019-05-24 DIAGNOSIS — R91.8 PULMONARY NODULES: Chronic | ICD-10-CM

## 2019-05-24 DIAGNOSIS — Z09 HOSPITAL DISCHARGE FOLLOW-UP: ICD-10-CM

## 2019-05-24 DIAGNOSIS — J96.11 CHRONIC RESPIRATORY FAILURE WITH HYPOXIA (HCC): Chronic | ICD-10-CM

## 2019-05-24 DIAGNOSIS — J18.9 PNEUMONIA DUE TO INFECTIOUS ORGANISM, UNSPECIFIED LATERALITY, UNSPECIFIED PART OF LUNG: ICD-10-CM

## 2019-05-24 PROCEDURE — 99214 OFFICE O/P EST MOD 30 MIN: CPT | Performed by: NURSE PRACTITIONER

## 2019-05-24 RX ORDER — VERAPAMIL HYDROCHLORIDE 120 MG/1
120 CAPSULE, EXTENDED RELEASE ORAL DAILY
COMMUNITY

## 2019-05-24 RX ORDER — FUROSEMIDE 20 MG/1
20 TABLET ORAL 2 TIMES DAILY
COMMUNITY
End: 2019-11-20

## 2019-05-24 NOTE — PROGRESS NOTES
Patient was recently admitted to [facility] 4/25/19-5/3/19 for ventilator dependent respiratory failure secondary to streptococcal pneumonia, sepsis due to streptococcal pneumonia, septic shock, and AECOPD.  CT scan 4/30/2019 indicated small left and trace right pleural effusions with compressive subsegmental atelectasis in the lung bases.  Centrilobular emphysema.  Stable and satisfactory endotracheal and nasogastric tube positions.  Most recent chest x-ray 5/1/2019 indicated increased left lower lung atelectasis following extubation. ECHO at that time did not indicated PAH. Patient was discharged to skilled nursing facility for continued monitoring of condition.  He was discharged on a azithromycin, Cefdinir, prednisone taper, nebulizer and Trelegy Ellipta 1 puff daily. His wife notes responding well to BIPAP therapy during stay.   At last OV, he was started on abx/steroid taper and advised 1mos f/u but he was hospitalized.     Hx of severe COPD with chronic respiratory failure and pulmonary nodules. Currently requiring oxygen 24/7.     Current smoker 1 pack daily, 17.5PYH. He was evaluated through [facility] in 2012 for Disability evaluation at which time pulmonary function testing confirmed severe COPD, FEV1 1 L.  Updated PFT 4/11/2018 indicates FEV1 1.04 L or 32% predicted, FEV1/FVC ratio 37, and a DLCO 60% predicted. He has had progressively worsening exertional dyspnea over the past years, and is now symptomatic with ADLs. He is sedentary most of the day due to his breathing. He is in wheelchair.    He relies on samples at the moment. He has no prescription coverage. He has not met criteria for assistance programs.     He is not motivated to quit smoking/drinking. He has a history of alcohol abuse, and continues to drink 10 beers daily.     Previously hospitalized 1/2018 at [facility] for AECOPD and suspected pneumonia with sepsis. Atrial fibrillation was noted on monitoring but rate controlled with diltiazem. He declined use  of anticoagulation. He is taking an 81mg ASA daily. ECHO indicated normal LVEF, Grade I Diastolic dysfunction and LVH. Troponin negative.      CT scan 6/13/18 indicates:  1.  Stable 5 mm left lower lobe pulmonary nodule  2.  Emphysema  3.  Mild pulmonary parenchymal scarring  4.  Aortic and coronary atherosclerotic plaque  5.  Pulmonary artery hypertension  6.  Cirrhosis and portal hypertension  7.  Cholelithiasis     Recommended CT scan in 1 year due to stability - due 6/2019. We may want to pursue HRCT, but patient is not interested in ILD work up due to possible cost.   We reviewed smoking and alcohol cessation in depth    Patient declines vaccinations.

## 2019-05-24 NOTE — PROGRESS NOTES
Patient was recently admitted to [facility] 4/25/19-5/3/19 for ventilator dependent respiratory failure secondary to streptococcal pneumonia, sepsis due to streptococcal pneumonia, septic shock, and AECOPD.  CT scan 4/30/2019 indicated small left and trace right pleural effusions with compressive subsegmental atelectasis in the lung bases.  Centrilobular emphysema.  Stable and satisfactory endotracheal and nasogastric tube positions.  Most recent chest x-ray 5/1/2019 indicated increased left lower lung atelectasis following extubation. ECHO at that time did not indicated PAH. Patient was discharged to skilled nursing facility for continued monitoring of condition.  He was discharged on a azithromycin, Cefdinir, prednisone taper, nebulizer and Trelegy Ellipta 1 puff daily. His wife notes responding well to BIPAP therapy during stay.   At last OV, he was started on abx/steroid taper and advised 1mos f/u but he was hospitalized.     Hx of severe COPD with chronic respiratory failure and pulmonary nodules. Currently requiring oxygen 24/7.     Current smoker 1 pack daily, 17.5PYH. He was evaluated through [facility] in 2012 for Disability evaluation at which time pulmonary function testing confirmed severe COPD, FEV1 1 L.  Updated PFT 4/11/2018 indicates FEV1 1.04 L or 32% predicted, FEV1/FVC ratio 37, and a DLCO 60% predicted. He has had progressively worsening exertional dyspnea over the past years, and is now symptomatic with ADLs. He is sedentary most of the day due to his breathing. He is in wheelchair.    He relies on samples at the moment. He has no prescription coverage. He has not met criteria for assistance programs.     He is not motivated to quit smoking/drinking. He has a history of alcohol abuse, and continues to drink 10 beers daily.     Previously hospitalized 1/2018 at [facility] for AECOPD and suspected pneumonia with sepsis. Atrial fibrillation was noted on monitoring but rate controlled with diltiazem. He declined use  of anticoagulation. He is taking an 81mg ASA daily. ECHO indicated normal LVEF, Grade I Diastolic dysfunction and LVH. Troponin negative.      CT scan 6/13/18 indicates:  1.  Stable 5 mm left lower lobe pulmonary nodule  2.  Emphysema  3.  Mild pulmonary parenchymal scarring  4.  Aortic and coronary atherosclerotic plaque  5.  Pulmonary artery hypertension  6.  Cirrhosis and portal hypertension  7.  Cholelithiasis     Recommended CT scan in 1 year due to stability - due 6/2019. We may want to pursue HRCT, but patient is not interested in ILD work up due to possible cost.   We reviewed smoking and alcohol cessation in depth    Patient declines vaccinations.

## 2019-05-24 NOTE — PATIENT INSTRUCTIONS
Start Clindamycin and prednisone taper now.  Restart Breo 200mcg 1 puff daily and Spiriva respimat 2.5mcg 2puffs daily.  Continue nebulizer 2-3x's daily.  May try mucinex, if having headaches, stop  I will be looking into breathing device for day/night use

## 2019-05-24 NOTE — LETTER
JOANIE Martinez  [facility] Pulmonary Medicine   [address] [place] [address] [place] [address]  Phone: [phone] - Fax: [phone]           Encounter Date: 5/24/2019  Provider: JOANIE Martinez  Location of Care: [facility] PULMONARY MEDICINE      Patient:   Parveen Blanc   MR Number: [id number]   YOB: 1950     PROGRESS NOTE:  Patient was recently admitted to [facility] 4/25/19-5/3/19 for ventilator dependent respiratory failure secondary to streptococcal pneumonia, sepsis due to streptococcal pneumonia, septic shock, and AECOPD.  CT scan 4/30/2019 indicated small left and trace right pleural effusions with compressive subsegmental atelectasis in the lung bases.  Centrilobular emphysema.  Stable and satisfactory endotracheal and nasogastric tube positions.  Most recent chest x-ray 5/1/2019 indicated increased left lower lung atelectasis following extubation. ECHO at that time did not indicated PAH. Patient was discharged to skilled nursing facility for continued monitoring of condition.  He was discharged on a azithromycin, Cefdinir, prednisone taper, nebulizer and Trelegy Ellipta 1 puff daily. His wife notes responding well to BIPAP therapy during stay.   At last OV, he was started on abx/steroid taper and advised 1mos f/u but he was hospitalized.     Hx of severe COPD with chronic respiratory failure and pulmonary nodules. Currently requiring oxygen 24/7.     Current smoker 1 pack daily, 17.5PYH. He was evaluated through [facility] in 2012 for Disability evaluation at which time pulmonary function testing confirmed severe COPD, FEV1 1 L.  Updated PFT 4/11/2018 indicates FEV1 1.04 L or 32% predicted, FEV1/FVC ratio 37, and a DLCO 60% predicted. He has had progressively worsening exertional dyspnea over the past years, and is now symptomatic with ADLs. He is sedentary most of the day due to his breathing. He is in wheelchair.    He relies on samples at the  moment. He has no prescription coverage. He has not met criteria for assistance programs.     He is not motivated to quit smoking/drinking. He has a history of alcohol abuse, and continues to drink 10 beers daily.     Previously hospitalized 1/2018 at [facility] for AECOPD and suspected pneumonia with sepsis. Atrial fibrillation was noted on monitoring but rate controlled with diltiazem. He declined use of anticoagulation. He is taking an 81mg ASA daily. ECHO indicated normal LVEF, Grade I Diastolic dysfunction and LVH. Troponin negative.      CT scan 6/13/18 indicates:  1.  Stable 5 mm left lower lobe pulmonary nodule  2.  Emphysema  3.  Mild pulmonary parenchymal scarring  4.  Aortic and coronary atherosclerotic plaque  5.  Pulmonary artery hypertension  6.  Cirrhosis and portal hypertension  7.  Cholelithiasis     Recommended CT scan in 1 year due to stability - due 6/2019. We may want to pursue HRCT, but patient is not interested in ILD work up due to possible cost.   We reviewed smoking and alcohol cessation in depth    Patient declines vaccinations.    Chief Complaint   Patient presents with   • Follow-Up     [facility]/ Pneumonia        HPI:  Parveen Blanc is a 68 y.o. year old male here today for follow-up on hospital discharge.     Patient was recently admitted to [facility] 4/25/19-5/3/19 for ventilator dependent respiratory failure secondary to streptococcal pneumonia, sepsis due to streptococcal pneumonia, septic shock, and AECOPD.  CT scan 4/30/2019 indicated small left and trace right pleural effusions with compressive subsegmental atelectasis in the lung bases.  Centrilobular emphysema.  Stable and satisfactory endotracheal and nasogastric tube positions.  Most recent chest x-ray 5/1/2019 indicated increased left lower lung atelectasis following extubation. ECHO at that time did not indicated PAH. Patient was discharged to skilled nursing facility for continued monitoring of condition.  He was discharged  on a azithromycin, Cefdinir, prednisone taper, nebulizer and Trelegy Ellipta 1 puff daily. His wife notes responding well to BIPAP therapy during stay.   At last OV, he was started on abx/steroid taper and advised 1mos f/u but he was hospitalized.     Hx of severe COPD with chronic respiratory failure and pulmonary nodules. Currently requiring oxygen 24/7.     Current smoker 1 pack daily, 17.5PYH. He was evaluated through [facility] in 2012 for Disability evaluation at which time pulmonary function testing confirmed severe COPD, FEV1 1 L.  Updated PFT 4/11/2018 indicates FEV1 1.04 L or 32% predicted, FEV1/FVC ratio 37, and a DLCO 60% predicted. He has had progressively worsening exertional dyspnea over the past years, and is now symptomatic with ADLs. He is sedentary most of the day due to his breathing. He is in wheelchair.    He relies on samples at the moment. He has no prescription coverage. He has not met criteria for assistance programs.   He currently uses only Duoneb. He does not like Trelegy but preferred Breo and Spiriva respimat.    He quit drinking/smoking during hospital stay and wife is motivated to refrain from these.     Previously hospitalized 1/2018 at [facility] for AECOPD and suspected pneumonia with sepsis. Atrial fibrillation was noted on monitoring but rate controlled with diltiazem. He declined use of anticoagulation. He is taking an 81mg ASA daily. ECHO indicated normal LVEF, Grade I Diastolic dysfunction and LVH. Troponin negative.      CT scan 6/13/18 indicates:  1.  Stable 5 mm left lower lobe pulmonary nodule  2.  Emphysema  3.  Mild pulmonary parenchymal scarring  4.  Aortic and coronary atherosclerotic plaque  5.  Pulmonary artery hypertension  6.  Cirrhosis and portal hypertension  7.  Cholelithiasis     Recommended CT scan in 1 year due to stability - due 6/2019. We may want to pursue HRCT, but patient is not interested in ILD work up due to possible cost.   We reviewed smoking and alcohol  cessation in depth    Patient declines vaccinations.  BMi 24 - he has lost 7lbs since 3/2019 visit.    Today he notes overall symptoms to improved since hospital stay but continues to feel short of breath, cough with white phlegm production and occasional wheezing.  He denies persistent chest tightness or pain.  He continues to have pedal edema feels this is slightly worse since hospital stay.  He is on a diuretic.  He notes having a chest x-ray while at rehab indicating need for continuing antibiotics.  He is prescribed clindamycin which they plan on filling today.  He also has a prednisone taper on hand at home he has not used.  We will initiate these today.  Patient would also benefit from trilogy NIV due to severity of lung disease with chronic respiratory failure.  Ventilator is required to decrease work of breathing, improved pulmonary status and if interruption of respiratory support occurs could lead to serious harm or death.  Patient responded well to BiPAP therapy in the hospital.        ROS: As per HPI and otherwise negative if not stated.    Past Medical History:   Diagnosis Date   • Asthma    • Back pain    • Bronchitis    • Chickenpox    • Chronic obstructive pulmonary disease (HCC)    • Depression    • Russian measles    • Hypertension    • Influenza    • Mumps    • Nasal drainage    • Pneumonia    • Pulmonary emphysema (HCC)    • Pulmonary hypertension (HCC)    • Restless leg syndrome    • Tonsillitis        Past Surgical History:   Procedure Laterality Date   • LAMINOTOMY     • TONSILLECTOMY         Family History   Problem Relation Age of Onset   • Diabetes Mother    • Heart Disease Father        Social History     Social History   • Marital status: Single     Spouse name: N/A   • Number of children: N/A   • Years of education: N/A     Occupational History   • Not on file.     Social History Main Topics   • Smoking status: Current Every Day Smoker     Packs/day: 0.50     Years: 35.00     Types:  "Cigarettes     Last attempt to quit: 6/1/2017   • Smokeless tobacco: Never Used   • Alcohol use 6.0 oz/week     10 Cans of beer per week   • Drug use: No   • Sexual activity: Not on file     Other Topics Concern   • Not on file     Social History Narrative   • No narrative on file       Allergies as of 05/24/2019 - Reviewed 05/24/2019   Allergen Reaction Noted   • Codeine Hives 01/26/2016   • Pcn [penicillins] Hives 01/26/2016        @Vital signs for this encounter:  Vitals:    05/24/19 1301 05/24/19 1307   Height: 1.778 m (5' 10\")    Weight: 77.1 kg (170 lb)    Weight % change since last entry.: 0 %    BP: 140/90    Pulse: 82    BMI (Calculated): 24.39    Resp: 16    O2 sat % on O2:  97 %   O2 Flow Rate (L/min):  3       Current medications as of today   Current Outpatient Prescriptions   Medication Sig Dispense Refill   • furosemide (LASIX) 20 MG Tab Take 20 mg by mouth 2 times a day.     • verapamil ER (CALAN SR) 120 MG CAPSULE SR 24 HR Take 120 mg by mouth every day.     • enalapril (VASOTEC) 10 MG Tab Take 10 mg by mouth every day.     • budesonide (PULMICORT) 0.5 MG/2ML Suspension 2 mL by Nebulization route 2 times a day. Inhale the contents of 1 vial via nebulizer twice daily. Rinse mouth after use. 60 mL 11   • VENTOLIN  (90 Base) MCG/ACT Aero Soln inhalation aerosol INHALE 1-2 PUFFS EVERY 4-6 HOURS AS NEEDED AND AS DIRECTED.  3   • albuterol (PROVENTIL) 2.5mg/3ml Nebu Soln solution for nebulization USE 1 UNIT DOSE IN NEBULIZER EVERY 4 HOURS AS NEEDED.  11   • Multiple Vitamins-Minerals (CENTRUM SILVER 50+MEN) Tab Take 1 tablet by mouth every day.     • folic acid (FOLVITE) 800 MCG tablet Take 800 mcg by mouth every day.     • calcium carbonate (TUMS) 500 MG Chew Tab Take 1 Tab by mouth every four hours as needed (indigestion). 30 Tab 3   • Glucosamine-Chondroit-Vit C-Mn (GLUCOSAMINE CHONDR 1500 COMPLX PO) Take 2 Tabs by mouth every day.       No current facility-administered medications for this " visit.          Physical Exam:   Gen:           Alert and oriented, No apparent distress. Mood and affect appropriate, normal interaction with examiner.  Eyes:          PERRL, EOM intact, sclere white, conjunctive moist.  Ears:          Not examined.   Hearing:     Grossly intact.  Nose:          Normal, no lesions or deformities.  Dentition:    Poor dentition.  Oropharynx:   Tongue normal, posterior pharynx without erythema or exudate.  Mallampati Classification: 2/3  Neck:        Supple, trachea midline, no masses.  Respiratory Effort: No intercostal retractions or use of accessory muscles.   Lung Auscultation:      Diminished throughout with trace wheezing.  No rhonchi or rales.  CV:            Regular rate and rhythm. No murmurs, rubs or gallops.  Abd:           Not examined.   Lymphadenopathy: Not examined.  Gait and Station: In wheelchair.  Digits and Nails: No clubbing, cyanosis, petechiae, or nodes.   Cranial Nerves: II-XII grossly intact.  Skin:        No rashes, lesions or ulcers noted.               Ext:           Bilateral lower extremity 2+ pedal edema.      Assessment:  1. Hospital discharge follow-up     2. Centrilobular emphysema (HCC)     3. Chronic respiratory failure with hypoxia (HCC)     4. Pulmonary nodules     5. Current smoker     6. Hypertension, unspecified type     7. Alcohol withdrawal syndrome without complication (HCC)         Immunizations:    Flu:declines  Pneumovax 23:declines  Prevnar 13:declines    Plan: All records reviewed today including last hospital stay.  Reviewed current medication list in depth with patient and wife.  1.  Patient will start clindamycin and prednisone taper now for continuation of symptoms.  2.  Request chest x-ray from rehab facility for review.  3.  Chest x-ray at next office visit to confirm resolution of pneumonia.  4.  We will restart bronchodilator therapy.  Patient does not have prescription coverage or part D of Medicare.  He relies on [employer].  Rx  sent for Brio 100 mcg 1 puff daily, and Spiriva Respimat 2.5 mcg 2 puffs once daily.  He will continue nebulizer 2-3 times daily.  5.  Strongly encouraged to refrain from any further smoking or alcohol use.  Patient is amenable to this today.  6.  Order for trilogy with oxygen bleed in. NIV is necessary to prevent further respiratory decline.  Patient is having frequent exacerbations and recurrent pneumonia.  7.  Follow-up in 1 month with chest x-ray to check symptoms, sooner if needed.    Please note that this dictation was created using voice recognition software. I have made every reasonable attempt to correct obvious errors, but it is possible there are errors of grammar and possibly content that I did not discover before finalizing the note.        Electronically signed by YUMIKO [email or web address]  on 05/24/19    Agusto Bhatt M.D.  [address]  [id number]  [place] [id number]  VIA Facsimile: [phone]

## 2019-05-24 NOTE — PROGRESS NOTES
Chief Complaint   Patient presents with   • Follow-Up     [facility]/ Pneumonia        HPI:  Parveen Blanc is a 68 y.o. year old male here today for follow-up on hospital discharge.     Patient was recently admitted to [facility] 4/25/19-5/3/19 for ventilator dependent respiratory failure secondary to streptococcal pneumonia, sepsis due to streptococcal pneumonia, septic shock, and AECOPD.  CT scan 4/30/2019 indicated small left and trace right pleural effusions with compressive subsegmental atelectasis in the lung bases.  Centrilobular emphysema.  Stable and satisfactory endotracheal and nasogastric tube positions.  Most recent chest x-ray 5/1/2019 indicated increased left lower lung atelectasis following extubation. ECHO at that time did not indicated PAH. Patient was discharged to skilled nursing facility for continued monitoring of condition.  He was discharged on a azithromycin, Cefdinir, prednisone taper, nebulizer and Trelegy Ellipta 1 puff daily. His wife notes responding well to BIPAP therapy during stay.   At last OV, he was started on abx/steroid taper and advised 1mos f/u but he was hospitalized.     Hx of severe COPD with chronic respiratory failure and pulmonary nodules. Currently requiring oxygen 24/7.     Current smoker 1 pack daily, 17.5PYH. He was evaluated through [facility] in 2012 for Disability evaluation at which time pulmonary function testing confirmed severe COPD, FEV1 1 L.  Updated PFT 4/11/2018 indicates FEV1 1.04 L or 32% predicted, FEV1/FVC ratio 37, and a DLCO 60% predicted. He has had progressively worsening exertional dyspnea over the past years, and is now symptomatic with ADLs. He is sedentary most of the day due to his breathing. He is in wheelchair.    He relies on samples at the moment. He has no prescription coverage. He has not met criteria for assistance programs.   He currently uses only Duoneb. He does not like Trelegy but preferred Breo and Spiriva respimat.    He quit  drinking/smoking during hospital stay and wife is motivated to refrain from these.     Previously hospitalized 1/2018 at [facility] for AECOPD and suspected pneumonia with sepsis. Atrial fibrillation was noted on monitoring but rate controlled with diltiazem. He declined use of anticoagulation. He is taking an 81mg ASA daily. ECHO indicated normal LVEF, Grade I Diastolic dysfunction and LVH. Troponin negative.      CT scan 6/13/18 indicates:  1.  Stable 5 mm left lower lobe pulmonary nodule  2.  Emphysema  3.  Mild pulmonary parenchymal scarring  4.  Aortic and coronary atherosclerotic plaque  5.  Pulmonary artery hypertension  6.  Cirrhosis and portal hypertension  7.  Cholelithiasis     Recommended CT scan in 1 year due to stability - due 6/2019. We may want to pursue HRCT, but patient is not interested in ILD work up due to possible cost.   We reviewed smoking and alcohol cessation in depth    Patient declines vaccinations.  BMi 24 - he has lost 7lbs since 3/2019 visit.    Today he notes overall symptoms to improved since hospital stay but continues to feel short of breath, cough with white phlegm production and occasional wheezing.  He denies persistent chest tightness or pain.  He continues to have pedal edema feels this is slightly worse since hospital stay.  He is on a diuretic.  He notes having a chest x-ray while at rehab indicating need for continuing antibiotics.  He is prescribed clindamycin which they plan on filling today.  He also has a prednisone taper on hand at home he has not used.  We will initiate these today.  Patient would also benefit from trilogy NIV due to severity of lung disease with chronic respiratory failure.  Ventilator is required to decrease work of breathing, improved pulmonary status and if interruption of respiratory support occurs could lead to serious harm or death.  Patient responded well to BiPAP therapy in the hospital.        ROS: As per HPI and otherwise negative if not  "stated.    Past Medical History:   Diagnosis Date   • Asthma    • Back pain    • Bronchitis    • Chickenpox    • Chronic obstructive pulmonary disease (HCC)    • Depression    • Sinhala measles    • Hypertension    • Influenza    • Mumps    • Nasal drainage    • Pneumonia    • Pulmonary emphysema (HCC)    • Pulmonary hypertension (HCC)    • Restless leg syndrome    • Tonsillitis        Past Surgical History:   Procedure Laterality Date   • LAMINOTOMY     • TONSILLECTOMY         Family History   Problem Relation Age of Onset   • Diabetes Mother    • Heart Disease Father        Social History     Social History   • Marital status: Single     Spouse name: N/A   • Number of children: N/A   • Years of education: N/A     Occupational History   • Not on file.     Social History Main Topics   • Smoking status: Current Every Day Smoker     Packs/day: 0.50     Years: 35.00     Types: Cigarettes     Last attempt to quit: 6/1/2017   • Smokeless tobacco: Never Used   • Alcohol use 6.0 oz/week     10 Cans of beer per week   • Drug use: No   • Sexual activity: Not on file     Other Topics Concern   • Not on file     Social History Narrative   • No narrative on file       Allergies as of 05/24/2019 - Reviewed 05/24/2019   Allergen Reaction Noted   • Codeine Hives 01/26/2016   • Pcn [penicillins] Hives 01/26/2016        @Vital signs for this encounter:  Vitals:    05/24/19 1301 05/24/19 1307   Height: 1.778 m (5' 10\")    Weight: 77.1 kg (170 lb)    Weight % change since last entry.: 0 %    BP: 140/90    Pulse: 82    BMI (Calculated): 24.39    Resp: 16    O2 sat % on O2:  97 %   O2 Flow Rate (L/min):  3       Current medications as of today   Current Outpatient Prescriptions   Medication Sig Dispense Refill   • furosemide (LASIX) 20 MG Tab Take 20 mg by mouth 2 times a day.     • verapamil ER (CALAN SR) 120 MG CAPSULE SR 24 HR Take 120 mg by mouth every day.     • enalapril (VASOTEC) 10 MG Tab Take 10 mg by mouth every day.     • " budesonide (PULMICORT) 0.5 MG/2ML Suspension 2 mL by Nebulization route 2 times a day. Inhale the contents of 1 vial via nebulizer twice daily. Rinse mouth after use. 60 mL 11   • VENTOLIN  (90 Base) MCG/ACT Aero Soln inhalation aerosol INHALE 1-2 PUFFS EVERY 4-6 HOURS AS NEEDED AND AS DIRECTED.  3   • albuterol (PROVENTIL) 2.5mg/3ml Nebu Soln solution for nebulization USE 1 UNIT DOSE IN NEBULIZER EVERY 4 HOURS AS NEEDED.  11   • Multiple Vitamins-Minerals (CENTRUM SILVER 50+MEN) Tab Take 1 tablet by mouth every day.     • folic acid (FOLVITE) 800 MCG tablet Take 800 mcg by mouth every day.     • calcium carbonate (TUMS) 500 MG Chew Tab Take 1 Tab by mouth every four hours as needed (indigestion). 30 Tab 3   • Glucosamine-Chondroit-Vit C-Mn (GLUCOSAMINE CHONDR 1500 COMPLX PO) Take 2 Tabs by mouth every day.       No current facility-administered medications for this visit.          Physical Exam:   Gen:           Alert and oriented, No apparent distress. Mood and affect appropriate, normal interaction with examiner.  Eyes:          PERRL, EOM intact, sclere white, conjunctive moist.  Ears:          Not examined.   Hearing:     Grossly intact.  Nose:          Normal, no lesions or deformities.  Dentition:    Poor dentition.  Oropharynx:   Tongue normal, posterior pharynx without erythema or exudate.  Mallampati Classification: 2/3  Neck:        Supple, trachea midline, no masses.  Respiratory Effort: No intercostal retractions or use of accessory muscles.   Lung Auscultation:      Diminished throughout with trace wheezing.  No rhonchi or rales.  CV:            Regular rate and rhythm. No murmurs, rubs or gallops.  Abd:           Not examined.   Lymphadenopathy: Not examined.  Gait and Station: In wheelchair.  Digits and Nails: No clubbing, cyanosis, petechiae, or nodes.   Cranial Nerves: II-XII grossly intact.  Skin:        No rashes, lesions or ulcers noted.               Ext:           Bilateral lower  extremity 2+ pedal edema.      Assessment:  1. Hospital discharge follow-up     2. Centrilobular emphysema (HCC)     3. Chronic respiratory failure with hypoxia (HCC)     4. Pulmonary nodules     5. Current smoker     6. Hypertension, unspecified type     7. Alcohol withdrawal syndrome without complication (HCC)         Immunizations:    Flu:declines  Pneumovax 23:declines  Prevnar 13:declines    Plan: All records reviewed today including last hospital stay.  Reviewed current medication list in depth with patient and wife.  1.  Patient will start clindamycin and prednisone taper now for continuation of symptoms.  2.  Request chest x-ray from rehab facility for review.  3.  Chest x-ray at next office visit to confirm resolution of pneumonia.  4.  We will restart bronchodilator therapy.  Patient does not have prescription coverage or part D of Medicare.  He relies on samples.  Rx sent for Brio 100 mcg 1 puff daily, and Spiriva Respimat 2.5 mcg 2 puffs once daily.  He will continue nebulizer 2-3 times daily.  5.  Strongly encouraged to refrain from any further smoking or alcohol use.  Patient is amenable to this today.  6.  Order for trilogy with oxygen bleed in. NIV is necessary to prevent further respiratory decline.  Patient is having frequent exacerbations and recurrent pneumonia.  7.  Follow-up in 1 month with chest x-ray to check symptoms, sooner if needed.    Please note that this dictation was created using voice recognition software. I have made every reasonable attempt to correct obvious errors, but it is possible there are errors of grammar and possibly content that I did not discover before finalizing the note.

## 2019-05-24 NOTE — TELEPHONE ENCOUNTER
Pt was DC'd from [facility] for Pneumonia and then sent to rehab in [facility]. I have request records STAT for apt today. Pts wife was wanting to pursue Cpap or Bipap therapy  Like he was on in the hospital. I explained the process and that he would need to qualify via Sleep Study. Pt aware he has CT apt in July

## 2019-05-28 ENCOUNTER — TELEPHONE (OUTPATIENT)
Dept: SLEEP MEDICINE | Facility: MEDICAL CENTER | Age: 69
End: 2019-05-28

## 2019-05-28 NOTE — TELEPHONE ENCOUNTER
----- Message from JOANIE Martinez sent at 5/24/2019  1:56 PM PDT -----  Regarding: Request records  Please request CXR report at rehab - [facility]

## 2019-06-26 ENCOUNTER — HOSPITAL ENCOUNTER (OUTPATIENT)
Dept: RADIOLOGY | Facility: MEDICAL CENTER | Age: 69
End: 2019-06-26
Attending: NURSE PRACTITIONER
Payer: MEDICARE

## 2019-06-26 ENCOUNTER — HOSPITAL ENCOUNTER (OUTPATIENT)
Dept: RADIOLOGY | Facility: MEDICAL CENTER | Age: 69
End: 2019-06-26

## 2019-06-26 DIAGNOSIS — J18.9 PNEUMONIA DUE TO INFECTIOUS ORGANISM, UNSPECIFIED LATERALITY, UNSPECIFIED PART OF LUNG: ICD-10-CM

## 2019-06-26 DIAGNOSIS — F17.200 CURRENT SMOKER: Chronic | ICD-10-CM

## 2019-06-26 DIAGNOSIS — J43.2 CENTRILOBULAR EMPHYSEMA (HCC): Chronic | ICD-10-CM

## 2019-06-26 PROCEDURE — 71046 X-RAY EXAM CHEST 2 VIEWS: CPT

## 2019-06-26 NOTE — PROGRESS NOTES
Last OV 5/24/19    hospital discharge.      Patient was recently admitted to [facility] 4/25/19-5/3/19 for ventilator dependent respiratory failure secondary to streptococcal pneumonia, sepsis due to streptococcal pneumonia, septic shock, and AECOPD.  CT scan 4/30/2019 indicated small left and trace right pleural effusions with compressive subsegmental atelectasis in the lung bases.  Centrilobular emphysema.  Stable and satisfactory endotracheal and nasogastric tube positions.  Most recent chest x-ray 5/1/2019 indicated increased left lower lung atelectasis following extubation. ECHO at that time did not indicated PAH. Patient was discharged to skilled nursing facility for continued monitoring of condition.  He was discharged on a azithromycin, Cefdinir, prednisone taper, nebulizer and Trelegy Ellipta 1 puff daily. His wife notes responding well to BIPAP therapy during stay.   At last OV, he was started on abx/steroid taper and advised 1mos f/u but he was hospitalized.      Hx of severe COPD with chronic respiratory failure and pulmonary nodules. Currently requiring oxygen 24/7.     Current smoker 1 pack daily, 17.5PYH. He was evaluated through [facility] in 2012 for Disability evaluation at which time pulmonary function testing confirmed severe COPD, FEV1 1 L.  Updated PFT 4/11/2018 indicates FEV1 1.04 L or 32% predicted, FEV1/FVC ratio 37, and a DLCO 60% predicted. He has had progressively worsening exertional dyspnea over the past years, and is now symptomatic with ADLs. He is sedentary most of the day due to his breathing. He is in wheelchair.    He relies on samples at the moment. He has no prescription coverage. He has not met criteria for assistance programs.   He currently uses only Duoneb. He does not like Trelegy but preferred Breo and Spiriva respimat.     He quit drinking/smoking during hospital stay and wife is motivated to refrain from these.     Previously hospitalized 1/2018 at [facility] for AECOPD and suspected  pneumonia with sepsis. Atrial fibrillation was noted on monitoring but rate controlled with diltiazem. He declined use of anticoagulation. He is taking an 81mg ASA daily. ECHO indicated normal LVEF, Grade I Diastolic dysfunction and LVH. Troponin negative.      CT scan 6/13/18 indicates:  1.  Stable 5 mm left lower lobe pulmonary nodule  2.  Emphysema  3.  Mild pulmonary parenchymal scarring  4.  Aortic and coronary atherosclerotic plaque  5.  Pulmonary artery hypertension  6.  Cirrhosis and portal hypertension  7.  Cholelithiasis     Recommended CT scan in 1 year due to stability - due 6/2019. We may want to pursue HRCT, but patient is not interested in ILD work up due to possible cost.   We reviewed smoking and alcohol cessation in depth     Patient declines vaccinations.  BMi 24 - he has lost 7lbs since 3/2019 visit.     Today he notes overall symptoms to improved since hospital stay but continues to feel short of breath, cough with white phlegm production and occasional wheezing.  He denies persistent chest tightness or pain.  He continues to have pedal edema feels this is slightly worse since hospital stay.  He is on a diuretic.  He notes having a chest x-ray while at rehab indicating need for continuing antibiotics.  He is prescribed clindamycin which they plan on filling today.  He also has a prednisone taper on hand at home he has not used.  We will initiate these today.  Patient would also benefit from trilogy NIV due to severity of lung disease with chronic respiratory failure.  Ventilator is required to decrease work of breathing, improved pulmonary status and if interruption of respiratory support occurs could lead to serious harm or death.  Patient responded well to BiPAP therapy in the hospital.     Assessment:  1. Hospital discharge follow-up      2. Centrilobular emphysema (HCC)      3. Chronic respiratory failure with hypoxia (HCC)      4. Pulmonary nodules      5. Current smoker      6.  Hypertension, unspecified type      7. Alcohol withdrawal syndrome without complication (HCC)            Immunizations:     Flu:declines  Pneumovax 23:declines  Prevnar 13:declines     Plan: All records reviewed today including last hospital stay.  Reviewed current medication list in depth with patient and wife.  1.  Patient will start clindamycin and prednisone taper now for continuation of symptoms.  2.  Request chest x-ray from rehab facility for review.  3.  Chest x-ray at next office visit to confirm resolution of pneumonia.  4.  We will restart bronchodilator therapy.  Patient does not have prescription coverage or part D of Medicare.  He relies on samples.  Rx sent for Brio 100 mcg 1 puff daily, and Spiriva Respimat 2.5 mcg 2 puffs once daily.  He will continue nebulizer 2-3 times daily.  5.  Strongly encouraged to refrain from any further smoking or alcohol use.  Patient is amenable to this today.  6.  Order for trilogy with oxygen bleed in. NIV is necessary to prevent further respiratory decline.  Patient is having frequent exacerbations and recurrent pneumonia.  7.  Follow-up in 1 month with chest x-ray to check symptoms, sooner if needed.

## 2019-06-28 ENCOUNTER — OFFICE VISIT (OUTPATIENT)
Dept: PULMONOLOGY | Facility: HOSPICE | Age: 69
End: 2019-06-28
Payer: MEDICARE

## 2019-06-28 VITALS
OXYGEN SATURATION: 96 % | SYSTOLIC BLOOD PRESSURE: 110 MMHG | DIASTOLIC BLOOD PRESSURE: 52 MMHG | BODY MASS INDEX: 24.34 KG/M2 | WEIGHT: 170 LBS | RESPIRATION RATE: 16 BRPM | HEIGHT: 70 IN | HEART RATE: 84 BPM

## 2019-06-28 DIAGNOSIS — J96.11 CHRONIC RESPIRATORY FAILURE WITH HYPOXIA (HCC): Chronic | ICD-10-CM

## 2019-06-28 DIAGNOSIS — F17.200 CURRENT SMOKER: Chronic | ICD-10-CM

## 2019-06-28 DIAGNOSIS — J43.2 CENTRILOBULAR EMPHYSEMA (HCC): Chronic | ICD-10-CM

## 2019-06-28 DIAGNOSIS — R91.8 PULMONARY NODULES: Chronic | ICD-10-CM

## 2019-06-28 PROCEDURE — 99214 OFFICE O/P EST MOD 30 MIN: CPT | Performed by: NURSE PRACTITIONER

## 2019-06-28 NOTE — PATIENT INSTRUCTIONS
Complete CT scan of chest, call to schedule  Start nebulizer in AM, followed by Yanet, use again in afternoon if feeling wheezing/vibration of chest  Continue oxygen 24/7  Try using BIPAP mask during the day - not plugged in to get use to it, and then add pressure to try it and at night; if too costly, contact medical equipment to return

## 2019-06-28 NOTE — PROGRESS NOTES
Chief Complaint   Patient presents with   • COPD       HPI:  Parveen Blanc is a 69 y.o. year old male here today for follow-up on hx of severe COPD with chronic respiratory failure and pulmonary nodules. Currently requiring oxygen 24/7.    Accompanied by his girlfriend.    Last OV 5/24/19    Last hospital stay [facility] 4/25/19-5/3/19 for ventilator dependent respiratory failure secondary to streptococcal pneumonia, sepsis due to streptococcal pneumonia, septic shock, and AECOPD.  CT scan 4/30/2019 indicated small left and trace right pleural effusions with compressive subsegmental atelectasis in the lung bases.  Centrilobular emphysema.  Stable and satisfactory endotracheal and nasogastric tube positions.  Most recent chest x-ray 5/1/2019 indicated increased left lower lung atelectasis following extubation. ECHO at that time did not indicated PAH. Patient was discharged to skilled nursing facility for continued monitoring of condition.  He was discharged on a azithromycin, Cefdinir, prednisone taper, nebulizer and Trelegy Ellipta 1 puff daily. His wife notes responding well to BIPAP therapy during stay.      He quit drinking/smoking during hospital stay. Former smoker 1 pack daily, 17.5PYH and not interested in quitting. He was evaluated through [facility] in 2012 for Disability evaluation at which time pulmonary function testing confirmed severe COPD, FEV1 1 L.  Updated PFT 4/11/2018 indicates FEV1 1.04 L or 32% predicted, FEV1/FVC ratio 37, and a DLCO 60% predicted. He has had progressively worsening exertional dyspnea over the past years, and is now symptomatic with ADLs. He is sedentary most of the day due to his breathing. He is in wheelchair.    He relies on samples at the moment. He has no prescription coverage. He has not met criteria for assistance programs.   He currently using Breo 100mcg 1 puff daily and Spiriva respimat 2.5mcg 2puffs once daily and duoneb nebulizer maybe 1 x daily.     Previously hospitalized  1/2018 at [facility] for AECOPD and suspected pneumonia with sepsis. Atrial fibrillation was noted on monitoring but rate controlled with diltiazem. He declined use of anticoagulation. He is taking an 81mg ASA daily. ECHO indicated normal LVEF, Grade I Diastolic dysfunction and LVH. Troponin negative.      CT scan 6/13/18 indicates:  1.  Stable 5 mm left lower lobe pulmonary nodule  2.  Emphysema  3.  Mild pulmonary parenchymal scarring  4.  Aortic and coronary atherosclerotic plaque  5.  Pulmonary artery hypertension  6.  Cirrhosis and portal hypertension  7.  Cholelithiasis     Recommended CT scan in 1 year due to stability - due 6/2019. We may want to pursue HRCT, but patient is not interested in ILD work up due to possible cost.    CXR 6/26/19 indicated:  Persistent consolidation at the lateral aspect LEFT lung base with associated pleural fluid.  Likely small chronic pleural fluid collection with associated atelectasis, however superimposed indolent pneumonia or infiltrating tumor are difficult to exclude.  Reviewed findings with patient. He is due for CT of chest this month due to continued smoking status.     Patient declines vaccinations.  BMi 24 - weight stable at 170lbs since 5/24/19.     NIV was ordered at last OV due to recurrent exacerbations/pneumonia and worsening COPD. He has tried multiple mask but feels claustrophobic with use. He feels he cannot afford copay for device either. We discussed long term benefit, but he may end up returning device. He will continue O2 24/7.    ROS: As per HPI and otherwise negative if not stated.    Past Medical History:   Diagnosis Date   • Asthma    • Back pain    • Bronchitis    • Chickenpox    • Chronic obstructive pulmonary disease (HCC)    • Depression    • [place] measles    • Hypertension    • Influenza    • Mumps    • Nasal drainage    • Pneumonia    • Pulmonary emphysema (HCC)    • Pulmonary hypertension (HCC)    • Restless leg syndrome    • Tonsillitis        Past  "Surgical History:   Procedure Laterality Date   • LAMINOTOMY     • TONSILLECTOMY         Family History   Problem Relation Age of Onset   • Diabetes Mother    • Heart Disease Father        Social History     Social History   • Marital status: Single     Spouse name: N/A   • Number of children: N/A   • Years of education: N/A     Occupational History   • Not on file.     Social History Main Topics   • Smoking status: Former Smoker     Packs/day: 0.50     Years: 35.00     Types: Cigarettes     Quit date: 5/1/2019   • Smokeless tobacco: Never Used   • Alcohol use No      Comment: stopped after 4/2019 hospital stay   • Drug use: No   • Sexual activity: Not on file     Other Topics Concern   • Not on file     Social History Narrative   • No narrative on file       Allergies as of 06/28/2019 - Reviewed 06/28/2019   Allergen Reaction Noted   • Codeine Hives 01/26/2016   • Pcn [penicillins] Hives 01/26/2016        @Vital signs for this encounter:  Vitals:    06/28/19 0940   Height: 1.778 m (5' 10\")   Weight: 77.1 kg (170 lb)   Weight % change since last entry.: 0 %   BP: 110/52   Pulse: 84   BMI (Calculated): 24.39   Resp: 16   O2 sat % on O2: 96 %   O2 Flow Rate (L/min): 2.5       Current medications as of today   Current Outpatient Prescriptions   Medication Sig Dispense Refill   • furosemide (LASIX) 20 MG Tab Take 20 mg by mouth 2 times a day.     • verapamil ER (CALAN SR) 120 MG CAPSULE SR 24 HR Take 120 mg by mouth every day.     • Tiotropium Bromide Monohydrate (SPIRIVA RESPIMAT) 2.5 MCG/ACT Aero Soln Inhale 2 Puffs by mouth every day. 2 Inhaler 0   • Fluticasone Furoate-Vilanterol (BREO ELLIPTA) 100-25 MCG/INH AEROSOL POWDER, BREATH ACTIVATED Inhale 1 Puff by mouth every day. Rinse mouth after use. 2 Each 0   • VENTOLIN  (90 Base) MCG/ACT Aero Soln inhalation aerosol INHALE 1-2 PUFFS EVERY 4-6 HOURS AS NEEDED AND AS DIRECTED.  3   • albuterol (PROVENTIL) 2.5mg/3ml Nebu Soln solution for nebulization USE 1 UNIT " DOSE IN NEBULIZER EVERY 4 HOURS AS NEEDED.  11   • Multiple Vitamins-Minerals (CENTRUM SILVER 50+MEN) Tab Take 1 tablet by mouth every day.     • folic acid (FOLVITE) 800 MCG tablet Take 800 mcg by mouth every day.     • calcium carbonate (TUMS) 500 MG Chew Tab Take 1 Tab by mouth every four hours as needed (indigestion). 30 Tab 3   • enalapril (VASOTEC) 10 MG Tab Take 10 mg by mouth every day.     • Glucosamine-Chondroit-Vit C-Mn (GLUCOSAMINE CHONDR 1500 COMPLX PO) Take 2 Tabs by mouth every day.       No current facility-administered medications for this visit.          Physical Exam:   Gen:           Alert and oriented, No apparent distress. Mood and affect appropriate, normal interaction with examiner.  Eyes:          PERRL, EOM intact, sclere white, conjunctive moist.  Ears:          Not examined.   Hearing:     Grossly intact.  Nose:          Normal, no lesions or deformities.  Dentition:    Good dentition.  Oropharynx:   Tongue normal, posterior pharynx without erythema or exudate.  Mallampati Classification: 2/3  Neck:        Supple, trachea midline, no masses.  Respiratory Effort: No intercostal retractions or use of accessory muscles.   Lung Auscultation:      Expiratory wheeze t/o; no rales/rhonchi.  CV:            Regular rate and rhythm. No murmurs, rubs or gallops.  Abd:           Not examined.   Lymphadenopathy: Not examined.  Gait and Station: IN wheelchair.  Digits and Nails: No clubbing, cyanosis, petechiae, or nodes.   Cranial Nerves: II-XII grossly intact.  Skin:        No rashes, lesions or ulcers noted.               Ext:           BLE 1+ pitting edema.      Assessment:  1. Centrilobular emphysema (HCC)     2. Chronic respiratory failure with hypoxia (HCC)     3. Pulmonary nodules     4. Current smoker     5. BMI 24.0-24.9, adult         Immunizations:    Flu:declines  Pneumovax 23:declines  Prevnar 13:declines    Plan:  1. COPD stable are stable, he denies worsening of symptoms.   Continue  inhaler regimen.  Rx for sample sent.  Advised using nebulizer atleast daily for wheezing.  Complete CT scan of chest now.  2.  Strain encouraged to continue to remain off cigarettes alcohol.  3.  Discussed respiratory hygiene.  4.  Continue NIV treatment but if not cost effective he will return it remain on oxygen therapy 24/7.  5.  Encourage routine walking.  6.  Follow-up at pending appointment in 2 weeks with CT scan results, sooner if needed.    Please note that this dictation was created using voice recognition software. I have made every reasonable attempt to correct obvious errors, but it is possible there are errors of grammar and possibly content that I did not discover before finalizing the note.

## 2019-06-28 NOTE — LETTER
JOANIE Martinez  [facility] Pulmonary Medicine   [address] [place] [address] [place] [address]  Phone: [phone] - Fax: [phone]           Encounter Date: 6/28/2019  Provider: JOANIE Martinez  Location of Care: [facility] PULMONARY MEDICINE      Patient:   Parveen Blanc   MR Number: [id number]   YOB: 1950     PROGRESS NOTE:  Chief Complaint   Patient presents with   • COPD       HPI:  Parveen Blanc is a 69 y.o. year old male here today for follow-up on hx of severe COPD with chronic respiratory failure and pulmonary nodules. Currently requiring oxygen 24/7.    Accompanied by his girlfriend.    Last OV 5/24/19    Last hospital stay [facility] 4/25/19-5/3/19 for ventilator dependent respiratory failure secondary to streptococcal pneumonia, sepsis due to streptococcal pneumonia, septic shock, and AECOPD.  CT scan 4/30/2019 indicated small left and trace right pleural effusions with compressive subsegmental atelectasis in the lung bases.  Centrilobular emphysema.  Stable and satisfactory endotracheal and nasogastric tube positions.  Most recent chest x-ray 5/1/2019 indicated increased left lower lung atelectasis following extubation. ECHO at that time did not indicated PAH. Patient was discharged to skilled nursing facility for continued monitoring of condition.  He was discharged on a azithromycin, Cefdinir, prednisone taper, nebulizer and Trelegy Ellipta 1 puff daily. His wife notes responding well to BIPAP therapy during stay.      He quit drinking/smoking during hospital stay. Former smoker 1 pack daily, 17.5PYH and not interested in quitting. He was evaluated through [facility] in 2012 for Disability evaluation at which time pulmonary function testing confirmed severe COPD, FEV1 1 L.  Updated PFT 4/11/2018 indicates FEV1 1.04 L or 32% predicted, FEV1/FVC ratio 37, and a DLCO 60% predicted. He has had progressively worsening exertional dyspnea over the  past years, and is now symptomatic with ADLs. He is sedentary most of the day due to his breathing. He is in wheelchair.    He relies on samples at the moment. He has no prescription coverage. He has not met criteria for assistance programs.   He currently using Breo 100mcg 1 puff daily and Spiriva respimat 2.5mcg 2puffs once daily and duoneb nebulizer maybe 1 x daily.     Previously hospitalized 1/2018 at [facility] for AECOPD and suspected pneumonia with sepsis. Atrial fibrillation was noted on monitoring but rate controlled with diltiazem. He declined use of anticoagulation. He is taking an 81mg ASA daily. ECHO indicated normal LVEF, Grade I Diastolic dysfunction and LVH. Troponin negative.      CT scan 6/13/18 indicates:  1.  Stable 5 mm left lower lobe pulmonary nodule  2.  Emphysema  3.  Mild pulmonary parenchymal scarring  4.  Aortic and coronary atherosclerotic plaque  5.  Pulmonary artery hypertension  6.  Cirrhosis and portal hypertension  7.  Cholelithiasis     Recommended CT scan in 1 year due to stability - due 6/2019. We may want to pursue HRCT, but patient is not interested in ILD work up due to possible cost.    CXR 6/26/19 indicated:  Persistent consolidation at the lateral aspect LEFT lung base with associated pleural fluid.  Likely small chronic pleural fluid collection with associated atelectasis, however superimposed indolent pneumonia or infiltrating tumor are difficult to exclude.  Reviewed findings with patient. He is due for CT of chest this month due to continued smoking status.     Patient declines vaccinations.  BMi 24 - weight stable at 170lbs since 5/24/19.     NIV was ordered at last OV due to recurrent exacerbations/pneumonia and worsening COPD. He has tried multiple mask but feels claustrophobic with use. He feels he cannot afford copay for device either. We discussed long term benefit, but he may end up returning device. He will continue O2 24/7.    ROS: As per HPI and otherwise negative  "if not stated.    Past Medical History:   Diagnosis Date   • Asthma    • Back pain    • Bronchitis    • Chickenpox    • Chronic obstructive pulmonary disease (HCC)    • Depression    • [place] measles    • Hypertension    • Influenza    • Mumps    • Nasal drainage    • Pneumonia    • Pulmonary emphysema (HCC)    • Pulmonary hypertension (HCC)    • Restless leg syndrome    • Tonsillitis        Past Surgical History:   Procedure Laterality Date   • LAMINOTOMY     • TONSILLECTOMY         Family History   Problem Relation Age of Onset   • Diabetes Mother    • Heart Disease Father        Social History     Social History   • Marital status: Single     Spouse name: N/A   • Number of children: N/A   • Years of education: N/A     Occupational History   • Not on file.     Social History Main Topics   • Smoking status: Former Smoker     Packs/day: 0.50     Years: 35.00     Types: Cigarettes     Quit date: 5/1/2019   • Smokeless tobacco: Never Used   • Alcohol use No      Comment: stopped after 4/2019 hospital stay   • Drug use: No   • Sexual activity: Not on file     Other Topics Concern   • Not on file     Social History Narrative   • No narrative on file       Allergies as of 06/28/2019 - Reviewed 06/28/2019   Allergen Reaction Noted   • Codeine Hives 01/26/2016   • Pcn [penicillins] Hives 01/26/2016        @Vital signs for this encounter:  Vitals:    06/28/19 0940   Height: 1.778 m (5' 10\")   Weight: 77.1 kg (170 lb)   Weight % change since last entry.: 0 %   BP: 110/52   Pulse: 84   BMI (Calculated): 24.39   Resp: 16   O2 sat % on O2: 96 %   O2 Flow Rate (L/min): 2.5       Current medications as of today   Current Outpatient Prescriptions   Medication Sig Dispense Refill   • furosemide (LASIX) 20 MG Tab Take 20 mg by mouth 2 times a day.     • verapamil ER (CALAN SR) 120 MG CAPSULE SR 24 HR Take 120 mg by mouth every day.     • Tiotropium Bromide Monohydrate (SPIRIVA RESPIMAT) 2.5 MCG/ACT Aero Soln Inhale 2 Puffs by mouth " every day. 2 Inhaler 0   • Fluticasone Furoate-Vilanterol (BREO ELLIPTA) 100-25 MCG/INH AEROSOL POWDER, BREATH ACTIVATED Inhale 1 Puff by mouth every day. Rinse mouth after use. 2 Each 0   • VENTOLIN  (90 Base) MCG/ACT Aero Soln inhalation aerosol INHALE 1-2 PUFFS EVERY 4-6 HOURS AS NEEDED AND AS DIRECTED.  3   • albuterol (PROVENTIL) 2.5mg/3ml Nebu Soln solution for nebulization USE 1 UNIT DOSE IN NEBULIZER EVERY 4 HOURS AS NEEDED.  11   • Multiple Vitamins-Minerals (CENTRUM SILVER 50+MEN) Tab Take 1 tablet by mouth every day.     • folic acid (FOLVITE) 800 MCG tablet Take 800 mcg by mouth every day.     • calcium carbonate (TUMS) 500 MG Chew Tab Take 1 Tab by mouth every four hours as needed (indigestion). 30 Tab 3   • enalapril (VASOTEC) 10 MG Tab Take 10 mg by mouth every day.     • Glucosamine-Chondroit-Vit C-Mn (GLUCOSAMINE CHONDR 1500 COMPLX PO) Take 2 Tabs by mouth every day.       No current facility-administered medications for this visit.          Physical Exam:   Gen:           Alert and oriented, No apparent distress. Mood and affect appropriate, normal interaction with examiner.  Eyes:          PERRL, EOM intact, sclere white, conjunctive moist.  Ears:          Not examined.   Hearing:     Grossly intact.  Nose:          Normal, no lesions or deformities.  Dentition:    Good dentition.  Oropharynx:   Tongue normal, posterior pharynx without erythema or exudate.  Mallampati Classification: 2/3  Neck:        Supple, trachea midline, no masses.  Respiratory Effort: No intercostal retractions or use of accessory muscles.   Lung Auscultation:      Expiratory wheeze t/o; no rales/rhonchi.  CV:            Regular rate and rhythm. No murmurs, rubs or gallops.  Abd:           Not examined.   Lymphadenopathy: Not examined.  Gait and Station: IN wheelchair.  Digits and Nails: No clubbing, cyanosis, petechiae, or nodes.   Cranial Nerves: II-XII grossly intact.  Skin:        No rashes, lesions or ulcers  noted.               Ext:           BLE 1+ pitting edema.      Assessment:  1. Centrilobular emphysema (HCC)     2. Chronic respiratory failure with hypoxia (HCC)     3. Pulmonary nodules     4. Current smoker     5. BMI 24.0-24.9, adult         Immunizations:    Flu:declines  Pneumovax 23:declines  Prevnar 13:declines    Plan:  1. COPD stable are stable, he denies worsening of symptoms.   Continue inhaler regimen.  Rx for sample sent.  Advised using nebulizer atleast daily for wheezing.  Complete CT scan of chest now.  2.  Strain encouraged to continue to remain off cigarettes alcohol.  3.  Discussed respiratory hygiene.  4.  Continue NIV treatment but if not cost effective he will return it remain on oxygen therapy 24/7.  5.  Encourage routine walking.  6.  Follow-up at pending appointment in 2 weeks with CT scan results, sooner if needed.    Please note that this dictation was created using voice recognition software. I have made every reasonable attempt to correct obvious errors, but it is possible there are errors of grammar and possibly content that I did not discover before finalizing the note.        Electronically signed by JOANIE Martinez  on 06/28/19    Agusto Bhatt M.D.  [id number]  [id number]  [place] [id number]  VIA Facsimile: [phone]

## 2019-07-02 ENCOUNTER — HOSPITAL ENCOUNTER (OUTPATIENT)
Dept: RADIOLOGY | Facility: MEDICAL CENTER | Age: 69
End: 2019-07-02
Attending: NURSE PRACTITIONER
Payer: MEDICARE

## 2019-07-02 ENCOUNTER — TELEPHONE (OUTPATIENT)
Dept: SLEEP MEDICINE | Facility: MEDICAL CENTER | Age: 69
End: 2019-07-02

## 2019-07-02 DIAGNOSIS — R91.8 PULMONARY NODULES: Chronic | ICD-10-CM

## 2019-07-02 DIAGNOSIS — F17.200 CURRENT SMOKER: Chronic | ICD-10-CM

## 2019-07-02 PROCEDURE — 71250 CT THORAX DX C-: CPT

## 2019-07-02 NOTE — TELEPHONE ENCOUNTER
Spoke to pt. He states he is still feeling about the same as he did when you saw him last week. He has an apt 7/10 which I have recommended he keep. If he start to feel more SOB or symptoms worsen he needs to report to the ER immediately. Pt agreed

## 2019-07-02 NOTE — TELEPHONE ENCOUNTER
----- Message from JOANIE Martinez sent at 7/2/2019 11:44 AM PDT -----  Please call patient and see if his dyspnea has become worse since I saw him last week. He does have some fluid on his left lung still. If he is stable, may f/u in office to discuss, otherwise needs to go to ER for evaluation.

## 2019-07-09 NOTE — PROGRESS NOTES
Last OV 6/28/19    CT results 7/2/19:  1.  Small to moderate-sized left pleural effusion which obscures the lung parenchyma at the site of 2 previously identified left lower lobe pulmonary nodules.  2.  No new pulmonary nodule.  3.  Stable emphysematous changes with minor peripheral show fibrotic change.  4.  No thoracic adenopathy.  5.  Follow-up CT examination in 3-6 months is recommended in attempt to visualize the left lower lobe.  Results called to patient; denied worsening of dyspnea    severe COPD with chronic respiratory failure and pulmonary nodules. Currently requiring oxygen 24/7.     Accompanied by his girlfriend.     Last OV 5/24/19     Last hospital stay [facility] 4/25/19-5/3/19 for ventilator dependent respiratory failure secondary to streptococcal pneumonia, sepsis due to streptococcal pneumonia, septic shock, and AECOPD.  CT scan 4/30/2019 indicated small left and trace right pleural effusions with compressive subsegmental atelectasis in the lung bases.  Centrilobular emphysema.  Stable and satisfactory endotracheal and nasogastric tube positions.  Most recent chest x-ray 5/1/2019 indicated increased left lower lung atelectasis following extubation. ECHO at that time did not indicated PAH. Patient was discharged to skilled nursing facility for continued monitoring of condition.  He was discharged on a azithromycin, Cefdinir, prednisone taper, nebulizer and Trelegy Ellipta 1 puff daily. His wife notes responding well to BIPAP therapy during stay.      He quit drinking/smoking during hospital stay. Former smoker 1 pack daily, 17.5PYH and not interested in quitting. He was evaluated through [facility] in 2012 for Disability evaluation at which time pulmonary function testing confirmed severe COPD, FEV1 1 L.  Updated PFT 4/11/2018 indicates FEV1 1.04 L or 32% predicted, FEV1/FVC ratio 37, and a DLCO 60% predicted. He has had progressively worsening exertional dyspnea over the past years, and is now symptomatic  with ADLs. He is sedentary most of the day due to his breathing. He is in wheelchair.    He relies on samples at the moment. He has no prescription coverage. He has not met criteria for assistance programs.   He currently using Breo 100mcg 1 puff daily and Spiriva respimat 2.5mcg 2puffs once daily and duoneb nebulizer maybe 1 x daily.     Previously hospitalized 1/2018 at [facility] for AECOPD and suspected pneumonia with sepsis. Atrial fibrillation was noted on monitoring but rate controlled with diltiazem. He declined use of anticoagulation. He is taking an 81mg ASA daily. ECHO indicated normal LVEF, Grade I Diastolic dysfunction and LVH. Troponin negative.      CT scan 6/13/18 indicates:  1.  Stable 5 mm left lower lobe pulmonary nodule  2.  Emphysema  3.  Mild pulmonary parenchymal scarring  4.  Aortic and coronary atherosclerotic plaque  5.  Pulmonary artery hypertension  6.  Cirrhosis and portal hypertension  7.  Cholelithiasis     Recommended CT scan in 1 year due to stability - due 6/2019. We may want to pursue HRCT, but patient is not interested in ILD work up due to possible cost.     CXR 6/26/19 indicated:  Persistent consolidation at the lateral aspect LEFT lung base with associated pleural fluid.  Likely small chronic pleural fluid collection with associated atelectasis, however superimposed indolent pneumonia or infiltrating tumor are difficult to exclude.  Reviewed findings with patient. He is due for CT of chest this month due to continued smoking status.      Patient declines vaccinations.  BMi 24 - weight stable at 170lbs since 5/24/19.     NIV was ordered at last OV due to recurrent exacerbations/pneumonia and worsening COPD. He has tried multiple mask but feels claustrophobic with use. He feels he cannot afford copay for device either. We discussed long term benefit, but he may end up returning device. He will continue O2 24/7.    Assessment:  1. Centrilobular emphysema (HCC)      2. Chronic  respiratory failure with hypoxia (HCC)      3. Pulmonary nodules      4. Current smoker      5. BMI 24.0-24.9, adult            Immunizations:     Flu:declines  Pneumovax 23:declines  Prevnar 13:declines     Plan:  1. COPD stable are stable, he denies worsening of symptoms.   Continue inhaler regimen.  Rx for sample sent.  Advised using nebulizer atleast daily for wheezing.  Complete CT scan of chest now.  2.  Strain encouraged to continue to remain off cigarettes alcohol.  3.  Discussed respiratory hygiene.  4.  Continue NIV treatment but if not cost effective he will return it remain on oxygen therapy 24/7.  5.  Encourage routine walking.  6.  Follow-up at pending appointment in 2 weeks with CT scan results, sooner if needed.

## 2019-07-10 ENCOUNTER — APPOINTMENT (OUTPATIENT)
Dept: RADIOLOGY | Facility: IMAGING CENTER | Age: 69
End: 2019-07-10
Payer: MEDICARE

## 2019-07-10 ENCOUNTER — OFFICE VISIT (OUTPATIENT)
Dept: PULMONOLOGY | Facility: HOSPICE | Age: 69
End: 2019-07-10
Payer: MEDICARE

## 2019-07-10 VITALS
HEART RATE: 89 BPM | OXYGEN SATURATION: 95 % | DIASTOLIC BLOOD PRESSURE: 62 MMHG | RESPIRATION RATE: 16 BRPM | SYSTOLIC BLOOD PRESSURE: 104 MMHG | WEIGHT: 178 LBS | HEIGHT: 70 IN | BODY MASS INDEX: 25.48 KG/M2

## 2019-07-10 DIAGNOSIS — R63.5 WEIGHT GAIN: ICD-10-CM

## 2019-07-10 DIAGNOSIS — J43.2 CENTRILOBULAR EMPHYSEMA (HCC): Chronic | ICD-10-CM

## 2019-07-10 DIAGNOSIS — J96.11 CHRONIC RESPIRATORY FAILURE WITH HYPOXIA (HCC): Chronic | ICD-10-CM

## 2019-07-10 DIAGNOSIS — F17.200 CURRENT SMOKER: Chronic | ICD-10-CM

## 2019-07-10 DIAGNOSIS — J90 PLEURAL EFFUSION: ICD-10-CM

## 2019-07-10 DIAGNOSIS — R91.8 PULMONARY NODULES: Chronic | ICD-10-CM

## 2019-07-10 PROCEDURE — 99214 OFFICE O/P EST MOD 30 MIN: CPT | Performed by: NURSE PRACTITIONER

## 2019-07-10 PROCEDURE — 71046 X-RAY EXAM CHEST 2 VIEWS: CPT | Mod: TC | Performed by: NURSE PRACTITIONER

## 2019-07-10 NOTE — PROGRESS NOTES
Chief Complaint   Patient presents with   • COPD     Ct Results 7/2/19       HPI:  Parveen Blanc is a 69 y.o. year old male here today for follow-up on CT results.     CT results 7/2/19:  1.  Small to moderate-sized left pleural effusion which obscures the lung parenchyma at the site of 2 previously identified left lower lobe pulmonary nodules.  2.  No new pulmonary nodule.  3.  Stable emphysematous changes with minor peripheral show fibrotic change.  4.  No thoracic adenopathy.  5.  Follow-up CT examination in 3-6 months is recommended in attempt to visualize the left lower lobe.  Results called to patient; denied worsening of dyspnea    CXR 6/26/19 indicated:  Persistent consolidation at the lateral aspect LEFT lung base with associated pleural fluid.  Likely small chronic pleural fluid collection with associated atelectasis, however superimposed indolent pneumonia or infiltrating tumor are difficult to exclude.    Pleural effusion not noted on 12/22/17 CT scan of chest.    Last OV 6/28/19    Hx of severe COPD with chronic respiratory failure and pulmonary nodules. Currently requiring oxygen 24/7.     Accompanied by his girlfriend.     Last hospital stay [facility] 4/25/19-5/3/19 for ventilator dependent respiratory failure secondary to streptococcal pneumonia, sepsis due to streptococcal pneumonia, septic shock, and AECOPD.  CT scan 4/30/2019 indicated small left and trace right pleural effusions with compressive subsegmental atelectasis in the lung bases.  Centrilobular emphysema.  Stable and satisfactory endotracheal and nasogastric tube positions. ECHO at that time did not indicate PAH. Patient was discharged to skilled nursing facility for continued monitoring of condition.  He was discharged on a azithromycin, Cefdinir, prednisone taper, nebulizer and Trelegy Ellipta 1 puff daily. His wife notes responding well to BIPAP therapy during stay.      He quit drinking/smoking during hospital stay May 2019. Former  smoker 1 pack daily, 17.5PYH and not interested in quitting.   He was evaluated through [facility] in 2012 for Disability evaluation at which time pulmonary function testing confirmed severe COPD, FEV1 1 L.    Updated PFT 4/11/2018 indicates FEV1 1.04 L or 32% predicted, FEV1/FVC ratio 37, and a DLCO 60% predicted.   He has had progressively worsening exertional dyspnea over the past years, and is now symptomatic with ADLs. He is sedentary most of the day due to his breathing. He is in wheelchair.    He relies on samples at the moment. He has no prescription coverage. He has not met criteria for assistance programs.   He currently using Breo 100mcg 1 puff daily and Spiriva respimat 2.5mcg 2puffs once daily and duoneb nebulizer maybe 1 x daily. We have reviewed appropriate use. He also has Ventolin HFA inhaler he may reach for more than 2x's daily.     Previously hospitalized 1/2018 at [facility] for AECOPD and suspected pneumonia with sepsis. Atrial fibrillation was noted on monitoring but rate controlled with diltiazem. He declined use of anticoagulation. He is taking an 81mg ASA daily. ECHO indicated normal LVEF, Grade I Diastolic dysfunction and LVH. Troponin negative.   He did not f/u with cardiology.     CT scan 6/13/18 indicates:  1.  Stable 5 mm left lower lobe pulmonary nodule  2.  Emphysema  3.  Mild pulmonary parenchymal scarring  4.  Aortic and coronary atherosclerotic plaque  5.  Pulmonary artery hypertension  6.  Cirrhosis and portal hypertension  7.  Cholelithiasis     Recommended CT scan in 1 year due to stability - due 6/2019. We may want to pursue HRCT, but patient is not interested in ILD work up due to possible cost.     Patient declines vaccinations.  BMi 24 - weight stable at 170lbs since 5/24/19. Bmi 25 and 178lbs today on visit.     NIV was ordered at last OV due to recurrent exacerbations/pneumonia and worsening COPD. He has tried multiple mask but feels claustrophobic with use. He feels he cannot afford  "copay for device either. We discussed long term benefit, but he may end up returning device. He will continue O2 24/7.    Today he notes feeling overall more fatigued with slight increase in dyspnea. He denies fever/chills/chest tightness or wheezing. He is using IS regularly. He is using lasix 40mg daily. He denies changes in appetite or meds since last OV.     ROS: As per HPI and otherwise negative if not stated.    Past Medical History:   Diagnosis Date   • Asthma    • Back pain    • Bronchitis    • Chickenpox    • Chronic obstructive pulmonary disease (HCC)    • Depression    • Bengali measles    • Hypertension    • Influenza    • Mumps    • Nasal drainage    • Pneumonia    • Pulmonary emphysema (HCC)    • Pulmonary hypertension (HCC)    • Restless leg syndrome    • Tonsillitis        Past Surgical History:   Procedure Laterality Date   • LAMINOTOMY     • TONSILLECTOMY         Family History   Problem Relation Age of Onset   • Diabetes Mother    • Heart Disease Father        Social History     Social History   • Marital status: Single     Spouse name: N/A   • Number of children: N/A   • Years of education: N/A     Occupational History   • Not on file.     Social History Main Topics   • Smoking status: Former Smoker     Packs/day: 0.50     Years: 35.00     Types: Cigarettes     Quit date: 5/1/2019   • Smokeless tobacco: Never Used   • Alcohol use No      Comment: stopped after 4/2019 hospital stay   • Drug use: No   • Sexual activity: Not on file     Other Topics Concern   • Not on file     Social History Narrative   • No narrative on file       Allergies as of 07/10/2019 - Reviewed 07/10/2019   Allergen Reaction Noted   • Codeine Hives 01/26/2016   • Pcn [penicillins] Hives 01/26/2016        @Vital signs for this encounter:  Vitals:    07/10/19 1028   Height: 1.778 m (5' 10\")   Weight: 80.7 kg (178 lb)   Weight % change since last entry.: 0 %   BP: 104/62   Pulse: 89   BMI (Calculated): 25.54   Resp: 16   O2 sat " % room air: 95 %       Current medications as of today   Current Outpatient Prescriptions   Medication Sig Dispense Refill   • Tiotropium Bromide Monohydrate (SPIRIVA RESPIMAT) 2.5 MCG/ACT Aero Soln Inhale 2 Puffs by mouth every day. 2 Inhaler 0   • Fluticasone Furoate-Vilanterol (BREO ELLIPTA) 100-25 MCG/INH AEROSOL POWDER, BREATH ACTIVATED Inhale 1 Puff by mouth every day. Rinse mouth after use. 2 Each 0   • furosemide (LASIX) 20 MG Tab Take 20 mg by mouth 2 times a day.     • verapamil ER (CALAN SR) 120 MG CAPSULE SR 24 HR Take 120 mg by mouth every day.     • VENTOLIN  (90 Base) MCG/ACT Aero Soln inhalation aerosol INHALE 1-2 PUFFS EVERY 4-6 HOURS AS NEEDED AND AS DIRECTED.  3   • albuterol (PROVENTIL) 2.5mg/3ml Nebu Soln solution for nebulization USE 1 UNIT DOSE IN NEBULIZER EVERY 4 HOURS AS NEEDED.  11   • Multiple Vitamins-Minerals (CENTRUM SILVER 50+MEN) Tab Take 1 tablet by mouth every day.     • folic acid (FOLVITE) 800 MCG tablet Take 800 mcg by mouth every day.     • enalapril (VASOTEC) 10 MG Tab Take 10 mg by mouth every day.     • Glucosamine-Chondroit-Vit C-Mn (GLUCOSAMINE CHONDR 1500 COMPLX PO) Take 2 Tabs by mouth every day.     • calcium carbonate (TUMS) 500 MG Chew Tab Take 1 Tab by mouth every four hours as needed (indigestion). 30 Tab 3     No current facility-administered medications for this visit.          Physical Exam:   Gen:           Alert and oriented, No apparent distress. Mood and affect appropriate, normal interaction with examiner.  Eyes:          PERRL, EOM intact, sclere white, conjunctive moist.  Ears:          Not examined.   Hearing:     Grossly intact.  Nose:          Normal, no lesions or deformities.  Dentition:    Good dentition.  Oropharynx:   Tongue normal, posterior pharynx without erythema or exudate.  Mallampati Classification: 2/3  Neck:        Supple, trachea midline, no masses.  Respiratory Effort: No intercostal retractions or use of accessory muscles.   Lung  Auscultation:      Clear to auscultation bilaterally but diminished t/o; no rales, rhonchi or wheezing.  CV:            Regular rate and rhythm. No murmurs, rubs or gallops.  Abd:           Not examined.   Lymphadenopathy: Not examined  Gait and Station: Normal.  Digits and Nails: No clubbing, cyanosis, petechiae, or nodes.   Cranial Nerves: II-XII grossly intact.  Skin:        No rashes, lesions or ulcers noted.               Ext:           BLE pedal edema 2+      Assessment:  1. Pleural effusion  DX-CHEST-2 VIEWS   2. Centrilobular emphysema (HCC)     3. Chronic respiratory failure with hypoxia (HCC)     4. Pulmonary nodules     5. Current smoker     6. BMI 25.0-25.9,adult     7. Weight gain         Immunizations:    Flu:declines  Pneumovax 23:declines  Prevnar 13:declines    Plan:  1. Patient does not appear to have infectious process at this time, but notes fatigue/slight increase in dyspnea. He is quite sedentary. He never established with cardiology. Pleural effusion at this time is most likely chronic and I do not suspect infection/malignant process. Dr. Brooks reviewed imaging.  CXR in office today:  Small left basilar pleural effusion and left basilar infiltration similar to previous.  Right lung base atelectasis with pneumonitis not excluded.  Reviewed findings with patient.  Advise increased lasix dose by 20mg x 3 days, then resuming usual dose. May add compression stockings in AM and remove prior to bedtime. Elevate legs during the day.  2. Continue current inhaler regimen.  3. Patient has stopped alcohol use as well.  4. Encouraged routine walking and IS use.  5. Patient remains of cigarettes.  6. Declines NIV due to cost.  7. Follow up in 2 months with CXR, sooner if needed.    Please note that this dictation was created using voice recognition software. I have made every reasonable attempt to correct obvious errors, but it is possible there are errors of grammar and possibly content that I did not  discover before finalizing the note.

## 2019-08-19 ENCOUNTER — TELEPHONE (OUTPATIENT)
Dept: PULMONOLOGY | Facility: HOSPICE | Age: 69
End: 2019-08-19

## 2019-08-19 NOTE — TELEPHONE ENCOUNTER
Marlen from [facility] called and was requesting records to make sure pt needs oxygen or not.  I asked for her to send a request via fax.  She said she will. Provided our fax number.

## 2019-08-26 NOTE — TELEPHONE ENCOUNTER
Have we ever prescribed this med? Yes.  If yes, what date? 07/10/2019    Last OV: 07/10/2019-THOM Guillory    Next OV: 09/04/2019-THOM Guillory    DX:     Medications:   Requested Prescriptions     Pending Prescriptions Disp Refills   • Tiotropium Bromide Monohydrate (SPIRIVA RESPIMAT) 2.5 MCG/ACT Aero Soln 2 Inhaler 0     Sig: Inhale 2 Puffs by mouth every day.   • Fluticasone Furoate-Vilanterol (BREO ELLIPTA) 100-25 MCG/INH AEROSOL POWDER, BREATH ACTIVATED 2 Each 0     Sig: Inhale 1 Puff by mouth every day. Rinse mouth after use.

## 2019-09-24 NOTE — TELEPHONE ENCOUNTER
Have we ever prescribed this med? Yes.  If yes, what date? 08/26/2019    Last OV: 07/10/2019- [place]    Next OV: No appointment on file-  Plan:  1. Patient does not appear to have infectious process at this time, but notes fatigue/slight increase in dyspnea. He is quite sedentary. He never established with cardiology. Pleural effusion at this time is most likely chronic and I do not suspect infection/malignant process. Dr. Brooks reviewed imaging.  CXR in office today:  Small left basilar pleural effusion and left basilar infiltration similar to previous.  Right lung base atelectasis with pneumonitis not excluded.  Reviewed findings with patient.  Advise increased lasix dose by 20mg x 3 days, then resuming usual dose. May add compression stockings in AM and remove prior to bedtime. Elevate legs during the day.  2. Continue current inhaler regimen.  3. Patient has stopped alcohol use as well.  4. Encouraged routine walking and IS use.  5. Patient remains of cigarettes.  6. Declines NIV due to cost.  7. Follow up in 2 months with CXR, sooner if needed.      Medications:   Requested Prescriptions     Pending Prescriptions Disp Refills   • Tiotropium Bromide Monohydrate (SPIRIVA RESPIMAT) 2.5 MCG/ACT Aero Soln 2 Inhaler 0     Sig: Inhale 2 Puffs by mouth every day.   • Fluticasone Furoate-Vilanterol (BREO ELLIPTA) 100-25 MCG/INH AEROSOL POWDER, BREATH ACTIVATED 2 Each 0     Sig: Inhale 1 Puff by mouth every day. Rinse mouth after use.

## 2019-10-24 NOTE — TELEPHONE ENCOUNTER
Pt called and was requesting RF(sample) for his Breo.      Have we ever prescribed this med? Yes.  If yes, what date? 09/24/19(Kahlil)    Last OV: 07/10/19 with Tierra RUIZ  Follow up in 2 months with CXR, sooner if needed      Next OV: No Pending appt.     DX:     Medications:   Requested Prescriptions     Pending Prescriptions Disp Refills   • Fluticasone Furoate-Vilanterol (BREO ELLIPTA) 100-25 MCG/INH AEROSOL POWDER, BREATH ACTIVATED 2 Each 0     Sig: Inhale 1 Puff by mouth every day. Rinse mouth after use.

## 2019-10-28 NOTE — TELEPHONE ENCOUNTER
Called pt and l/m. Notifying pt his sasmples was approved and [place]    Samples placed in front office.     Dispensed Breo 100mcg/25mcg samples x2.    Lot#: [id number]  Exp: 04/2020     Provider: Keyla Mendoza PA-C    Logged distribution of sample on data sheet.     Dispensed by: Mireya Cerrato

## 2019-11-13 ENCOUNTER — TELEPHONE (OUTPATIENT)
Dept: PULMONOLOGY | Facility: HOSPICE | Age: 69
End: 2019-11-13

## 2019-11-13 NOTE — TELEPHONE ENCOUNTER
Discontinuation of therapy is not recommended. He can have these returned and sign AMA with company.

## 2019-11-13 NOTE — TELEPHONE ENCOUNTER
Patient wants orders sent to [facility] [employer]. Patient calling states he has never been able to use his Bi-pap as he is claustrophobic. He does use THEIR home concentrator BUT does not use the bottles for the POC. He bought his own battery POC. Patient wants orders sent to [employer] for discontinue Bi-pap AND POC bottles. Last seen 7/10/19 with Tierra SHELTON please advise.

## 2019-11-19 NOTE — PROGRESS NOTES
PATIENT CALLED IN REQUESTING DC OF      Today he notes feeling overall more fatigued with slight increase in dyspnea. He denies fever/chills/chest tightness or wheezing. He is using IS regularly. He is using lasix 40mg daily. He denies changes in appetite or meds since last OV.        Assessment:  1. Pleural effusion  DX-CHEST-2 VIEWS   2. Centrilobular emphysema (HCC)      3. Chronic respiratory failure with hypoxia (HCC)      4. Pulmonary nodules      5. Current smoker      6. BMI 25.0-25.9,adult      7. Weight gain            Immunizations:     Flu:declines  Pneumovax 23:declines  Prevnar 13:declines     Plan:  1. Patient does not appear to have infectious process at this time, but notes fatigue/slight increase in dyspnea. He is quite sedentary. He never established with cardiology. Pleural effusion at this time is most likely chronic and I do not suspect infection/malignant process. Dr. Brooks reviewed imaging.  CXR in office today:  Small left basilar pleural effusion and left basilar infiltration similar to previous.  Right lung base atelectasis with pneumonitis not excluded.  Reviewed findings with patient.  Advise increased lasix dose by 20mg x 3 days, then resuming usual dose. May add compression stockings in AM and remove prior to bedtime. Elevate legs during the day.  2. Continue current inhaler regimen.  3. Patient has stopped alcohol use as well.  4. Encouraged routine walking and IS use.  5. Patient remains of cigarettes.  6. Declines NIV due to cost.  7. Follow up in 2 months with CXR, sooner if needed.

## 2019-11-20 ENCOUNTER — OFFICE VISIT (OUTPATIENT)
Dept: PULMONOLOGY | Facility: HOSPICE | Age: 69
End: 2019-11-20
Payer: MEDICARE

## 2019-11-20 ENCOUNTER — APPOINTMENT (OUTPATIENT)
Dept: RADIOLOGY | Facility: IMAGING CENTER | Age: 69
End: 2019-11-20
Attending: NURSE PRACTITIONER
Payer: MEDICARE

## 2019-11-20 VITALS
HEART RATE: 72 BPM | DIASTOLIC BLOOD PRESSURE: 60 MMHG | HEIGHT: 70 IN | SYSTOLIC BLOOD PRESSURE: 110 MMHG | OXYGEN SATURATION: 97 % | WEIGHT: 170 LBS | RESPIRATION RATE: 16 BRPM | BODY MASS INDEX: 24.34 KG/M2

## 2019-11-20 DIAGNOSIS — R73.9 HYPERGLYCEMIA: ICD-10-CM

## 2019-11-20 DIAGNOSIS — R60.0 PEDAL EDEMA: ICD-10-CM

## 2019-11-20 DIAGNOSIS — I10 HYPERTENSION, UNSPECIFIED TYPE: ICD-10-CM

## 2019-11-20 DIAGNOSIS — I27.81 COR PULMONALE (CHRONIC) (HCC): ICD-10-CM

## 2019-11-20 DIAGNOSIS — Z87.891 FORMER SMOKER: ICD-10-CM

## 2019-11-20 DIAGNOSIS — J96.11 CHRONIC RESPIRATORY FAILURE WITH HYPOXIA (HCC): Chronic | ICD-10-CM

## 2019-11-20 DIAGNOSIS — J90 PLEURAL EFFUSION: ICD-10-CM

## 2019-11-20 DIAGNOSIS — J43.2 CENTRILOBULAR EMPHYSEMA (HCC): Chronic | ICD-10-CM

## 2019-11-20 DIAGNOSIS — R91.8 PULMONARY NODULES: Chronic | ICD-10-CM

## 2019-11-20 DIAGNOSIS — Z51.81 MEDICATION MONITORING ENCOUNTER: ICD-10-CM

## 2019-11-20 PROCEDURE — 99214 OFFICE O/P EST MOD 30 MIN: CPT | Performed by: NURSE PRACTITIONER

## 2019-11-20 PROCEDURE — 71046 X-RAY EXAM CHEST 2 VIEWS: CPT | Mod: TC | Performed by: NURSE PRACTITIONER

## 2019-11-20 RX ORDER — FUROSEMIDE 20 MG/1
20 TABLET ORAL
Qty: 60 TAB | Refills: 5 | Status: SHIPPED | OUTPATIENT
Start: 2019-11-20

## 2019-11-20 RX ORDER — POTASSIUM CHLORIDE 1500 MG/1
20 TABLET, EXTENDED RELEASE ORAL
Qty: 60 TAB | Refills: 5 | Status: SHIPPED | OUTPATIENT
Start: 2019-11-20

## 2019-11-20 NOTE — LETTER
JOANIE Martinez  [facility]   [address],   [facility] [place] [address]  Phone: [phone] - Fax: [phone]           Encounter Date: 11/20/2019  Provider: JOANIE Martinez  Location of Care: [facility] PULMONARY MEDICINE      Patient:   Parveen Blanc   MR Number: [id number]   YOB: 1950     PROGRESS NOTE:  Chief Complaint   Patient presents with   • COPD     Xray        HPI:  Parveen Blanc is a 69 y.o. year old male here today for follow-up on Hx of severe COPD with chronic respiratory failure and pulmonary nodules. Currently requiring oxygen 24/7.  He is accompanied by his significant other.     Last OV 7/10/19    He quit drinking/smoking during hospital stay May 2019. Former smoker 1 pack daily, 17.5PYH and not interested in quitting.  He was evaluated through [facility] in 2012 for Disability evaluation at which time pulmonary function testing confirmed severe COPD, FEV1 1 L.    Updated PFT 4/11/2018 indicates FEV1 1.04 L or 32% predicted, FEV1/FVC ratio 37, and a DLCO 60% predicted.   He has had progressively worsening exertional dyspnea over the past years, and is now symptomatic with ADLs. He is sedentary most of the day due to his breathing. He is in wheelchair.    He relies on samples at the moment. He has no prescription coverage. He has not met criteria for assistance programs.   He currently using Breo 100mcg 1 puff daily and Spiriva respimat 2.5mcg 2puffs once daily and duoneb nebulizer 2-3x's daily. We have reviewed appropriate use. He also has Ventolin HFA inhaler he may reach for 1x daily prior to bedtime.  He returned Trilogy and O2 equipment to [facility]. He was intolerant to Trilogy. He owns his own concentrator and portable O2.   He remains on lasix 40mg daily and potassium 20meq daily. He notes no pedal edema.    Last hospital stay [facility] 4/25/19-5/3/19 for ventilator dependent respiratory failure secondary to  streptococcal pneumonia, sepsis due to streptococcal pneumonia, septic shock, and AECOPD.     CT results 7/2/19:  1.  Small to moderate-sized left pleural effusion which obscures the lung parenchyma at the site of 2 previously identified left lower lobe pulmonary nodules.  2.  No new pulmonary nodule.  3.  Stable emphysematous changes with minor peripheral show fibrotic change.  4.  No thoracic adenopathy.  5.  Follow-up CT examination in 3-6 months is recommended in attempt to visualize the left lower lobe.  Results called to patient; denied worsening of dyspnea     Recommended CT scan in 1 year due to stability - due 6/2019. We may want to pursue HRCT, but patient is not interested in ILD work up due to possible cost.  CXR today notes improvement with on acute cardiopulmonary process, hyperinflation noted, interval decrease in LLL pleural effusion. Left basal atelectasis/scarring. Reviewed findings with patient.     Patient declines vaccinations.  BMi 24 - weight stable at 170lbs since 5/24/19.     Today he notes breathing to be stable. He does have some cough/phlegm white and thick but at baseline. He denies chest pain/tightness or wheezing. He overall feels breathing is stable. He complains of frequent urination due to diuretics. He has not had updated lab work monitoring for this. We change his dose due to no pedal edema noted and update labs. He is also concerned about his blood sugars. He has not seen PCP since hospital discharge.         ROS: As per HPI and otherwise negative if not stated.    Past Medical History:   Diagnosis Date   • Asthma    • Back pain    • Bronchitis    • Chickenpox    • Chronic obstructive pulmonary disease (HCC)    • Depression    • Martiniquais measles    • Hypertension    • Influenza    • Mumps    • Nasal drainage    • Pneumonia    • Pulmonary emphysema (HCC)    • Pulmonary hypertension (HCC)    • Restless leg syndrome    • Tonsillitis        Past Surgical History:   Procedure Laterality  Date   • LAMINOTOMY     • TONSILLECTOMY         Family History   Problem Relation Age of Onset   • Diabetes Mother    • Heart Disease Father        Social History     Socioeconomic History   • Marital status: Single     Spouse name: Not on file   • Number of children: Not on file   • Years of education: Not on file   • Highest education level: Not on file   Occupational History   • Not on file   Social Needs   • Financial resource strain: Not on file   • Food insecurity:     Worry: Not on file     Inability: Not on file   • Transportation needs:     Medical: Not on file     Non-medical: Not on file   Tobacco Use   • Smoking status: Former Smoker     Packs/day: 0.50     Years: 35.00     Pack years: 17.50     Types: Cigarettes     Last attempt to quit: 2019     Years since quittin.5   • Smokeless tobacco: Never Used   Substance and Sexual Activity   • Alcohol use: No     Alcohol/week: 6.0 oz     Types: 10 Cans of beer per week     Comment: stopped after 2019 hospital stay   • Drug use: No   • Sexual activity: Not on file   Lifestyle   • Physical activity:     Days per week: Not on file     Minutes per session: Not on file   • Stress: Not on file   Relationships   • Social connections:     Talks on phone: Not on file     Gets together: Not on file     Attends Hinduism service: Not on file     Active member of club or organization: Not on file     Attends meetings of clubs or organizations: Not on file     Relationship status: Not on file   • Intimate partner violence:     Fear of current or ex partner: Not on file     Emotionally abused: Not on file     Physically abused: Not on file     Forced sexual activity: Not on file   Other Topics Concern   • Not on file   Social History Narrative   • Not on file       Allergies as of 2019 - Reviewed 2019   Allergen Reaction Noted   • Codeine Hives 2016   • Pcn [penicillins] Hives 2016        Vitals:  @Vital signs for this encounter:    Current  medications as of today   Current Outpatient Medications   Medication Sig Dispense Refill   • Fluticasone Furoate-Vilanterol (BREO ELLIPTA) 100-25 MCG/INH AEROSOL POWDER, BREATH ACTIVATED Inhale 1 Puff by mouth every day. Rinse mouth after use. 2 Each 0   • Tiotropium Bromide Monohydrate (SPIRIVA RESPIMAT) 2.5 MCG/ACT Aero Soln Inhale 2 Puffs by mouth every day. 2 Inhaler 0   • furosemide (LASIX) 20 MG Tab Take 20 mg by mouth 2 times a day.     • verapamil ER (CALAN SR) 120 MG CAPSULE SR 24 HR Take 120 mg by mouth every day.     • VENTOLIN  (90 Base) MCG/ACT Aero Soln inhalation aerosol INHALE 1-2 PUFFS EVERY 4-6 HOURS AS NEEDED AND AS DIRECTED.  3   • albuterol (PROVENTIL) 2.5mg/3ml Nebu Soln solution for nebulization USE 1 UNIT DOSE IN NEBULIZER EVERY 4 HOURS AS NEEDED.  11   • Multiple Vitamins-Minerals (CENTRUM SILVER 50+MEN) Tab Take 1 tablet by mouth every day.     • folic acid (FOLVITE) 800 MCG tablet Take 800 mcg by mouth every day.     • calcium carbonate (TUMS) 500 MG Chew Tab Take 1 Tab by mouth every four hours as needed (indigestion). 30 Tab 3   • enalapril (VASOTEC) 10 MG Tab Take 10 mg by mouth every day.     • Glucosamine-Chondroit-Vit C-Mn (GLUCOSAMINE CHONDR 1500 COMPLX PO) Take 2 Tabs by mouth every day.       No current facility-administered medications for this visit.          Physical Exam:   Gen:           Alert and oriented, No apparent distress. Mood and affect appropriate, normal interaction with examiner.  Eyes:          PERRL, EOM intact, sclere white, conjunctive moist.  Ears:          Not examined.   Hearing:     Grossly intact.  Nose:          Normal, no lesions or deformities.  Dentition:    Good dentition.  Oropharynx:   Tongue normal, posterior pharynx without erythema or exudate.  Mallampati Classification: 2/3  Neck:        Supple, trachea midline, no masses.  Respiratory Effort: No intercostal retractions or use of accessory muscles.   Lung Auscultation:      Clear to  auscultation bilaterally and diminished t/o; no rales, rhonchi or wheezing.  CV:            Regular rate and rhythm. No murmurs, rubs or gallops.  Abd:           Not examined.   Lymphadenopathy: Not examined.  Gait and Station: IN wheelchair.  Digits and Nails: No clubbing, cyanosis, petechiae, or nodes.   Cranial Nerves: II-XII grossly intact.  Skin:        No rashes, lesions or ulcers noted.               Ext:           No cyanosis or edema.      Assessment:  1. Centrilobular emphysema (HCC)     2. Chronic respiratory failure with hypoxia (HCC)     3. Pulmonary nodules     4. Hypertension, unspecified type     5. Former smoker     6. BMI 24.0-24.9, adult         Immunizations:    Flu:declines  Pneumovax 23:declines  Prevnar 13:declines    Plan:  1. COPD is clinically stable. He will continue to benefit from inhaler therapy and o2 use 24/7.  We will adjust his diuretic dosing to lasix 20mg QOD with KCl 20meq QOD. He will obtain labwork in the next week for monitoring of this and to check his A1c. He will go see PCP in the next 1-2 weeks for evaluation.  Samples given today.  2. Discussed respiratory hygiene.  3. Patient remains off cigarettes and alcohol. I congratulated him on cessation.  4. Follow up in 3 months to check symptoms, sooner if needed. Advised patient to contact office if acute symptoms occur to treat ASAP to prevent hospitalization.    Please note that this dictation was created using voice recognition software. I have made every reasonable attempt to correct obvious errors, but it is possible there are errors of grammar and possibly content that I did not discover before finalizing the note.      .Dispensed Fluticasone Furoate-Vilanterol (BREO ELLIPTA) 100-25 MCG/INH AEROSOL POWDER, BREATH ACTIVATED   samples x2.    Lot#: [id number]  Exp: 11/2020    Provider: Kahlil Orozco     Logged distribution of sample on data sheet.     Dispensed by: Maria T Lopez      Dispensed Tiotropium Bromide Monohydrate  (SPIRIVA RESPIMAT) 2.5 MCG/ACT Aero Soln   samples x2.    Lot#: [id number]  Exp: 02/2022    Provider: Kahlil Orozco     Logged distribution of sample on data sheet.     Dispensed by: Maria T Lopez        Electronically signed by JOANIE Martinez  on 11/20/19    Agusto Bhatt M.D.  [id number]  [id number]  [facility] [id number]  VIA Facsimile: [phone]

## 2019-11-20 NOTE — PROGRESS NOTES
Chief Complaint   Patient presents with   • COPD     Xray        HPI:  Parveen Blanc is a 69 y.o. year old male here today for follow-up on Hx of severe COPD with chronic respiratory failure and pulmonary nodules. Currently requiring oxygen 24/7.  He is accompanied by his significant other.     Last OV 7/10/19    He quit drinking/smoking during hospital stay May 2019. Former smoker 1 pack daily, 17.5PYH and not interested in quitting.  He was evaluated through [facility] in 2012 for Disability evaluation at which time pulmonary function testing confirmed severe COPD, FEV1 1 L.    Updated PFT 4/11/2018 indicates FEV1 1.04 L or 32% predicted, FEV1/FVC ratio 37, and a DLCO 60% predicted.   He has had progressively worsening exertional dyspnea over the past years, and is now symptomatic with ADLs. He is sedentary most of the day due to his breathing. He is in wheelchair.    He relies on samples at the moment. He has no prescription coverage. He has not met criteria for assistance programs.   He currently using Breo 100mcg 1 puff daily and Spiriva respimat 2.5mcg 2puffs once daily and duoneb nebulizer 2-3x's daily. We have reviewed appropriate use. He also has Ventolin HFA inhaler he may reach for 1x daily prior to bedtime.  He returned Trilogy and O2 equipment to [facility]. He was intolerant to Trilogy. He owns his own concentrator and portable O2.   He remains on lasix 40mg daily and potassium 20meq daily. He notes no pedal edema.    Last hospital stay [facility] 4/25/19-5/3/19 for ventilator dependent respiratory failure secondary to streptococcal pneumonia, sepsis due to streptococcal pneumonia, septic shock, and AECOPD.     CT results 7/2/19:  1.  Small to moderate-sized left pleural effusion which obscures the lung parenchyma at the site of 2 previously identified left lower lobe pulmonary nodules.  2.  No new pulmonary nodule.  3.  Stable emphysematous changes with minor peripheral show fibrotic change.  4.  No thoracic  adenopathy.  5.  Follow-up CT examination in 3-6 months is recommended in attempt to visualize the left lower lobe.  Results called to patient; denied worsening of dyspnea     Recommended CT scan in 1 year due to stability - due 6/2019. We may want to pursue HRCT, but patient is not interested in ILD work up due to possible cost.  CXR today notes improvement with on acute cardiopulmonary process, hyperinflation noted, interval decrease in LLL pleural effusion. Left basal atelectasis/scarring. Reviewed findings with patient.     Patient declines vaccinations.  BMi 24 - weight stable at 170lbs since 5/24/19.     Today he notes breathing to be stable. He does have some cough/phlegm white and thick but at baseline. He denies chest pain/tightness or wheezing. He overall feels breathing is stable. He complains of frequent urination due to diuretics. He has not had updated lab work monitoring for this. We change his dose due to no pedal edema noted and update labs. He is also concerned about his blood sugars. He has not seen PCP since hospital discharge.         ROS: As per HPI and otherwise negative if not stated.    Past Medical History:   Diagnosis Date   • Asthma    • Back pain    • Bronchitis    • Chickenpox    • Chronic obstructive pulmonary disease (HCC)    • Depression    • Faroese measles    • Hypertension    • Influenza    • Mumps    • Nasal drainage    • Pneumonia    • Pulmonary emphysema (HCC)    • Pulmonary hypertension (HCC)    • Restless leg syndrome    • Tonsillitis        Past Surgical History:   Procedure Laterality Date   • LAMINOTOMY     • TONSILLECTOMY         Family History   Problem Relation Age of Onset   • Diabetes Mother    • Heart Disease Father        Social History     Socioeconomic History   • Marital status: Single     Spouse name: Not on file   • Number of children: Not on file   • Years of education: Not on file   • Highest education level: Not on file   Occupational History   • Not on file    Social Needs   • Financial resource strain: Not on file   • Food insecurity:     Worry: Not on file     Inability: Not on file   • Transportation needs:     Medical: Not on file     Non-medical: Not on file   Tobacco Use   • Smoking status: Former Smoker     Packs/day: 0.50     Years: 35.00     Pack years: 17.50     Types: Cigarettes     Last attempt to quit: 2019     Years since quittin.5   • Smokeless tobacco: Never Used   Substance and Sexual Activity   • Alcohol use: No     Alcohol/week: 6.0 oz     Types: 10 Cans of beer per week     Comment: stopped after 2019 hospital stay   • Drug use: No   • Sexual activity: Not on file   Lifestyle   • Physical activity:     Days per week: Not on file     Minutes per session: Not on file   • Stress: Not on file   Relationships   • Social connections:     Talks on phone: Not on file     Gets together: Not on file     Attends Faith service: Not on file     Active member of club or organization: Not on file     Attends meetings of clubs or organizations: Not on file     Relationship status: Not on file   • Intimate partner violence:     Fear of current or ex partner: Not on file     Emotionally abused: Not on file     Physically abused: Not on file     Forced sexual activity: Not on file   Other Topics Concern   • Not on file   Social History Narrative   • Not on file       Allergies as of 2019 - Reviewed 2019   Allergen Reaction Noted   • Codeine Hives 2016   • Pcn [penicillins] Hives 2016        Vitals:  @Vital signs for this encounter:    Current medications as of today   Current Outpatient Medications   Medication Sig Dispense Refill   • Fluticasone Furoate-Vilanterol (BREO ELLIPTA) 100-25 MCG/INH AEROSOL POWDER, BREATH ACTIVATED Inhale 1 Puff by mouth every day. Rinse mouth after use. 2 Each 0   • Tiotropium Bromide Monohydrate (SPIRIVA RESPIMAT) 2.5 MCG/ACT Aero Soln Inhale 2 Puffs by mouth every day. 2 Inhaler 0   • furosemide  (LASIX) 20 MG Tab Take 20 mg by mouth 2 times a day.     • verapamil ER (CALAN SR) 120 MG CAPSULE SR 24 HR Take 120 mg by mouth every day.     • VENTOLIN  (90 Base) MCG/ACT Aero Soln inhalation aerosol INHALE 1-2 PUFFS EVERY 4-6 HOURS AS NEEDED AND AS DIRECTED.  3   • albuterol (PROVENTIL) 2.5mg/3ml Nebu Soln solution for nebulization USE 1 UNIT DOSE IN NEBULIZER EVERY 4 HOURS AS NEEDED.  11   • Multiple Vitamins-Minerals (CENTRUM SILVER 50+MEN) Tab Take 1 tablet by mouth every day.     • folic acid (FOLVITE) 800 MCG tablet Take 800 mcg by mouth every day.     • calcium carbonate (TUMS) 500 MG Chew Tab Take 1 Tab by mouth every four hours as needed (indigestion). 30 Tab 3   • enalapril (VASOTEC) 10 MG Tab Take 10 mg by mouth every day.     • Glucosamine-Chondroit-Vit C-Mn (GLUCOSAMINE CHONDR 1500 COMPLX PO) Take 2 Tabs by mouth every day.       No current facility-administered medications for this visit.          Physical Exam:   Gen:           Alert and oriented, No apparent distress. Mood and affect appropriate, normal interaction with examiner.  Eyes:          PERRL, EOM intact, sclere white, conjunctive moist.  Ears:          Not examined.   Hearing:     Grossly intact.  Nose:          Normal, no lesions or deformities.  Dentition:    Good dentition.  Oropharynx:   Tongue normal, posterior pharynx without erythema or exudate.  Mallampati Classification: 2/3  Neck:        Supple, trachea midline, no masses.  Respiratory Effort: No intercostal retractions or use of accessory muscles.   Lung Auscultation:      Clear to auscultation bilaterally and diminished t/o; no rales, rhonchi or wheezing.  CV:            Regular rate and rhythm. No murmurs, rubs or gallops.  Abd:           Not examined.   Lymphadenopathy: Not examined.  Gait and Station: IN wheelchair.  Digits and Nails: No clubbing, cyanosis, petechiae, or nodes.   Cranial Nerves: II-XII grossly intact.  Skin:        No rashes, lesions or ulcers noted.                Ext:           No cyanosis or edema.      Assessment:  1. Centrilobular emphysema (HCC)     2. Chronic respiratory failure with hypoxia (HCC)     3. Pulmonary nodules     4. Hypertension, unspecified type     5. Former smoker     6. BMI 24.0-24.9, adult         Immunizations:    Flu:declines  Pneumovax 23:declines  Prevnar 13:declines    Plan:  1. COPD is clinically stable. He will continue to benefit from inhaler therapy and o2 use 24/7.  We will adjust his diuretic dosing to lasix 20mg QOD with KCl 20meq QOD. He will obtain labwork in the next week for monitoring of this and to check his A1c. He will go see PCP in the next 1-2 weeks for evaluation.  Samples given today.  2. Discussed respiratory hygiene.  3. Patient remains off cigarettes and alcohol. I congratulated him on cessation.  4. Follow up in 3 months to check symptoms, sooner if needed. Advised patient to contact office if acute symptoms occur to treat ASAP to prevent hospitalization.    Please note that this dictation was created using voice recognition software. I have made every reasonable attempt to correct obvious errors, but it is possible there are errors of grammar and possibly content that I did not discover before finalizing the note.

## 2019-11-20 NOTE — PROGRESS NOTES
.Dispensed Fluticasone Furoate-Vilanterol (BREO ELLIPTA) 100-25 MCG/INH AEROSOL POWDER, BREATH ACTIVATED   samples x2.    Lot#: [id number]  Exp: 11/2020    Provider: Kahlil Orozco     Logged distribution of sample on data sheet.     Dispensed by: Maria T Lopez      Dispensed Tiotropium Bromide Monohydrate (SPIRIVA RESPIMAT) 2.5 MCG/ACT Aero Soln   samples x2.    Lot#: [id number]  Exp: 02/2022    Provider: Kahlil Orozco     Logged distribution of sample on data sheet.     Dispensed by: Maria T Lopez

## 2019-11-26 ENCOUNTER — TELEPHONE (OUTPATIENT)
Dept: PULMONOLOGY | Facility: HOSPICE | Age: 69
End: 2019-11-26

## 2019-11-26 NOTE — TELEPHONE ENCOUNTER
Marilee from [facility] called request for RX change, per patient request.     Marilee states that K-Tab is too hard for patient to swallow and patient was requesting an alternative like K-Dur.     Patient was last seen 11/20/19 Marbella RUIZ    Next OV 02/19/2020 Marbella RUIZ    Please Advise

## 2019-11-26 NOTE — TELEPHONE ENCOUNTER
Elana from [facility] pharmacy called in regards to medication change of potassium prescription. States hard for patient to take would like to prescription potassium tab that can be dissolved and easier for patient to take.     I informed SARA Cardona verbal confirmed for phramacy to change to dissolving script of potassium.    I spoke to the pharmacist Tere informed per SARA Cardona can change prescription

## 2019-12-30 ENCOUNTER — TELEPHONE (OUTPATIENT)
Dept: PULMONOLOGY | Facility: HOSPICE | Age: 69
End: 2019-12-30

## 2019-12-30 DIAGNOSIS — J96.90 RESPIRATORY FAILURE, UNSPECIFIED CHRONICITY, UNSPECIFIED WHETHER WITH HYPOXIA OR HYPERCAPNIA (HCC): ICD-10-CM

## 2019-12-31 NOTE — TELEPHONE ENCOUNTER
Have we ever prescribed this med? Yes.  If yes, what date?12/13/17    Last OV:11/20/19    Next OV: 2/19/20    DX: chronic respiratory failure    Medications: spiriva and breo

## 2020-01-23 DIAGNOSIS — J96.90 RESPIRATORY FAILURE, UNSPECIFIED CHRONICITY, UNSPECIFIED WHETHER WITH HYPOXIA OR HYPERCAPNIA (HCC): ICD-10-CM

## 2020-01-23 NOTE — TELEPHONE ENCOUNTER
Dispensed Breo 100mcg samples x2.    Lot#: [id number]  Exp: 03/2020    Provider: Marbella RUIZ    Logged distribution of sample on data sheet.     Dispensed by: Adilia Villarreal, Enio Ass't      Patient has been informed Breo 100mcg is ready for

## 2020-01-23 NOTE — TELEPHONE ENCOUNTER
Have we ever prescribed this med? Yes.  If yes, what date? 12/30/19 Anika APRN    Last OV: 11/20/19 Marbella APRN    Next OV: 02/19/20 Marbella APRN    DX: Respiratory failure, unspecified chronicity, unspecified whether with hypoxia or hypercapnia     Medications: Fluticasone Furoate-Vilanterol (BREO ELLIPTA) 100-25 MCG/INH AEROSOL POWDER, BREATH ACTIVATED    Tiotropium Bromide Monohydrate (SPIRIVA RESPIMAT) 2.5 MCG/ACT Aero Soln      Patient is requesting samples for Breo and Spiriva    Informed patient we do not currently have Spiriva but we do have Breo.

## 2020-02-18 ENCOUNTER — TELEPHONE (OUTPATIENT)
Dept: PULMONOLOGY | Facility: HOSPICE | Age: 70
End: 2020-02-18

## 2020-02-18 NOTE — TELEPHONE ENCOUNTER
Pt requested that we reschedule his appt as he did not recall that we was to complete any labs. He informed that he would prefer to complete the labs before he is seen. Pt rescheduled to be seen on 2/26/2020 and is aware that the labs are to be completed beforehand. Also advised he needs to fast.

## 2020-02-18 NOTE — TELEPHONE ENCOUNTER
----- Message from JOANIE Martinez sent at 2/18/2020  3:50 PM PST -----  Please have patient complete labs tomorrow AM; needs to fast for results at appt time tomorrow.

## 2020-02-18 NOTE — PROGRESS NOTES
Last OV 11/20/19    Hx of severe COPD with chronic respiratory failure and pulmonary nodules. Currently requiring oxygen 24/7.  He is accompanied by his significant other.      Last OV 7/10/19     He quit drinking/smoking during hospital stay May 2019. Former smoker 1 pack daily, 17.5PYH and not interested in quitting.  He was evaluated through [facility] in 2012 for Disability evaluation at which time pulmonary function testing confirmed severe COPD, FEV1 1 L.    Updated PFT 4/11/2018 indicates FEV1 1.04 L or 32% predicted, FEV1/FVC ratio 37, and a DLCO 60% predicted.   He has had progressively worsening exertional dyspnea over the past years, and is now symptomatic with ADLs. He is sedentary most of the day due to his breathing. He is in wheelchair.    He relies on samples at the moment. He has no prescription coverage. He has not met criteria for assistance programs.   He currently using Breo 100mcg 1 puff daily and Spiriva respimat 2.5mcg 2puffs once daily and duoneb nebulizer 2-3x's daily. We have reviewed appropriate use. He also has Ventolin HFA inhaler he may reach for 1x daily prior to bedtime.  He returned Trilogy and O2 equipment to [facility]. He was intolerant to Trilogy. He owns his own concentrator and portable O2.   He remains on lasix 40mg daily and potassium 20meq daily. He notes no pedal edema.     Last hospital stay [facility] 4/25/19-5/3/19 for ventilator dependent respiratory failure secondary to streptococcal pneumonia, sepsis due to streptococcal pneumonia, septic shock, and AECOPD.      CT results 7/2/19:  1.  Small to moderate-sized left pleural effusion which obscures the lung parenchyma at the site of 2 previously identified left lower lobe pulmonary nodules.  2.  No new pulmonary nodule.  3.  Stable emphysematous changes with minor peripheral show fibrotic change.  4.  No thoracic adenopathy.  5.  Follow-up CT examination in 3-6 months is recommended in attempt to visualize the left lower lobe.  Results  called to patient; denied worsening of dyspnea      Recommended CT scan in 1 year due to stability - due 6/2019. We may want to pursue HRCT, but patient is not interested in ILD work up due to possible cost.  CXR today notes improvement with on acute cardiopulmonary process, hyperinflation noted, interval decrease in LLL pleural effusion. Left basal atelectasis/scarring. Reviewed findings with patient.     Patient declines vaccinations.  BMi 24 - weight stable at 170lbs since 5/24/19.      Today he notes breathing to be stable. He does have some cough/phlegm white and thick but at baseline. He denies chest pain/tightness or wheezing. He overall feels breathing is stable. He complains of frequent urination due to diuretics. He has not had updated lab work monitoring for this. We change his dose due to no pedal edema noted and update labs. He is also concerned about his blood sugars. He has not seen PCP since hospital discharge.        Assessment:  1. Centrilobular emphysema (HCC)      2. Chronic respiratory failure with hypoxia (HCC)      3. Pulmonary nodules      4. Hypertension, unspecified type      5. Former smoker      6. BMI 24.0-24.9, adult            Immunizations:     Flu:declines  Pneumovax 23:declines  Prevnar 13:declines     Plan:  1. COPD is clinically stable. He will continue to benefit from inhaler therapy and o2 use 24/7.  We will adjust his diuretic dosing to lasix 20mg QOD with KCl 20meq QOD. He will obtain labwork in the next week for monitoring of this and to check his A1c. He will go see PCP in the next 1-2 weeks for evaluation.  Samples given today.  2. Discussed respiratory hygiene.  3. Patient remains off cigarettes and alcohol. I congratulated him on cessation.  4. Follow up in 3 months to check symptoms, sooner if needed. Advised patient to contact office if acute symptoms occur to treat ASAP to prevent hospitalization.    Labs not completed for diuretic monitoring

## 2020-02-19 ENCOUNTER — APPOINTMENT (OUTPATIENT)
Dept: PULMONOLOGY | Facility: HOSPICE | Age: 70
End: 2020-02-19
Payer: MEDICARE

## 2020-02-25 NOTE — PROGRESS NOTES
LABS    Last OV 11/20/19    severe COPD with chronic respiratory failure and pulmonary nodules. Currently requiring oxygen 24/7.  He is accompanied by his significant other.      Last OV 7/10/19     He quit drinking/smoking during hospital stay May 2019. Former smoker 1 pack daily, 17.5PYH and not interested in quitting.  He was evaluated through [facility] in 2012 for Disability evaluation at which time pulmonary function testing confirmed severe COPD, FEV1 1 L.    Updated PFT 4/11/2018 indicates FEV1 1.04 L or 32% predicted, FEV1/FVC ratio 37, and a DLCO 60% predicted.   He has had progressively worsening exertional dyspnea over the past years, and is now symptomatic with ADLs. He is sedentary most of the day due to his breathing. He is in wheelchair.    He relies on samples at the moment. He has no prescription coverage. He has not met criteria for assistance programs.   He currently using Breo 100mcg 1 puff daily and Spiriva respimat 2.5mcg 2puffs once daily and duoneb nebulizer 2-3x's daily. We have reviewed appropriate use. He also has Ventolin HFA inhaler he may reach for 1x daily prior to bedtime.  He returned Trilogy and O2 equipment to [facility]. He was intolerant to Trilogy. He owns his own concentrator and portable O2.   He remains on lasix 40mg daily and potassium 20meq daily. He notes no pedal edema.     Last hospital stay [facility] 4/25/19-5/3/19 for ventilator dependent respiratory failure secondary to streptococcal pneumonia, sepsis due to streptococcal pneumonia, septic shock, and AECOPD.      CT results 7/2/19:  1.  Small to moderate-sized left pleural effusion which obscures the lung parenchyma at the site of 2 previously identified left lower lobe pulmonary nodules.  2.  No new pulmonary nodule.  3.  Stable emphysematous changes with minor peripheral show fibrotic change.  4.  No thoracic adenopathy.  5.  Follow-up CT examination in 3-6 months is recommended in attempt to visualize the left lower lobe.  Results  called to patient; denied worsening of dyspnea      Recommended CT scan in 1 year due to stability - due 6/2019. We may want to pursue HRCT, but patient is not interested in ILD work up due to possible cost.  CXR today notes improvement with on acute cardiopulmonary process, hyperinflation noted, interval decrease in LLL pleural effusion. Left basal atelectasis/scarring. Reviewed findings with patient.     Patient declines vaccinations.  BMi 24 - weight stable at 170lbs since 5/24/19.      Today he notes breathing to be stable. He does have some cough/phlegm white and thick but at baseline. He denies chest pain/tightness or wheezing. He overall feels breathing is stable. He complains of frequent urination due to diuretics. He has not had updated lab work monitoring for this. We change his dose due to no pedal edema noted and update labs. He is also concerned about his blood sugars. He has not seen PCP since hospital discharge.    Assessment:  1. Centrilobular emphysema (HCC)      2. Chronic respiratory failure with hypoxia (HCC)      3. Pulmonary nodules      4. Hypertension, unspecified type      5. Former smoker      6. BMI 24.0-24.9, adult            Immunizations:     Flu:declines  Pneumovax 23:declines  Prevnar 13:declines     Plan:  1. COPD is clinically stable. He will continue to benefit from inhaler therapy and o2 use 24/7.  We will adjust his diuretic dosing to lasix 20mg QOD with KCl 20meq QOD. He will obtain labwork in the next week for monitoring of this and to check his A1c. He will go see PCP in the next 1-2 weeks for evaluation.  Samples given today.  2. Discussed respiratory hygiene.  3. Patient remains off cigarettes and alcohol. I congratulated him on cessation.  4. Follow up in 3 months to check symptoms, sooner if needed. Advised patient to contact office if acute symptoms occur to treat ASAP to prevent hospitalization.

## 2020-02-26 ENCOUNTER — OFFICE VISIT (OUTPATIENT)
Dept: PULMONOLOGY | Facility: HOSPICE | Age: 70
End: 2020-02-26
Payer: MEDICARE

## 2020-02-26 VITALS
DIASTOLIC BLOOD PRESSURE: 74 MMHG | WEIGHT: 177 LBS | OXYGEN SATURATION: 96 % | RESPIRATION RATE: 16 BRPM | BODY MASS INDEX: 25.34 KG/M2 | HEART RATE: 66 BPM | SYSTOLIC BLOOD PRESSURE: 128 MMHG | HEIGHT: 70 IN

## 2020-02-26 DIAGNOSIS — J43.2 CENTRILOBULAR EMPHYSEMA (HCC): Chronic | ICD-10-CM

## 2020-02-26 DIAGNOSIS — Z87.891 FORMER SMOKER: ICD-10-CM

## 2020-02-26 DIAGNOSIS — R91.8 PULMONARY NODULES: Chronic | ICD-10-CM

## 2020-02-26 DIAGNOSIS — J43.9 PULMONARY EMPHYSEMA, UNSPECIFIED EMPHYSEMA TYPE (HCC): ICD-10-CM

## 2020-02-26 DIAGNOSIS — F17.200 CURRENT SMOKER: Chronic | ICD-10-CM

## 2020-02-26 DIAGNOSIS — J90 PLEURAL EFFUSION, NOT ELSEWHERE CLASSIFIED: ICD-10-CM

## 2020-02-26 DIAGNOSIS — J96.11 CHRONIC RESPIRATORY FAILURE WITH HYPOXIA (HCC): Chronic | ICD-10-CM

## 2020-02-26 DIAGNOSIS — Z51.81 MEDICATION MONITORING ENCOUNTER: ICD-10-CM

## 2020-02-26 DIAGNOSIS — I10 HYPERTENSION, UNSPECIFIED TYPE: ICD-10-CM

## 2020-02-26 DIAGNOSIS — J96.90 RESPIRATORY FAILURE, UNSPECIFIED CHRONICITY, UNSPECIFIED WHETHER WITH HYPOXIA OR HYPERCAPNIA (HCC): ICD-10-CM

## 2020-02-26 PROCEDURE — 99214 OFFICE O/P EST MOD 30 MIN: CPT | Performed by: NURSE PRACTITIONER

## 2020-02-26 RX ORDER — IPRATROPIUM BROMIDE AND ALBUTEROL SULFATE 2.5; .5 MG/3ML; MG/3ML
3 SOLUTION RESPIRATORY (INHALATION) EVERY 4 HOURS PRN
Qty: 540 ML | Refills: 11
Start: 2020-02-26

## 2020-02-26 RX ORDER — TIOTROPIUM BROMIDE INHALATION SPRAY 3.12 UG/1
2 SPRAY, METERED RESPIRATORY (INHALATION) DAILY
Qty: 2 INHALER | Refills: 0 | COMMUNITY
Start: 2020-02-26 | End: 2020-03-25 | Stop reason: SDUPTHER

## 2020-02-26 NOTE — LETTER
JOANIE Martinez  [facility]   [address] [place] [address] [place] [address]  Phone: [phone] - Fax: [phone]           Encounter Date: 2/26/2020  Provider: JOANIE Martinez  Location of Care: [facility]      Patient:   Parveen Blanc   MR Number: [id number]   YOB: 1950     PROGRESS NOTE:  Chief Complaint   Patient presents with   • Follow-Up     Last seen 11/20/19   • Results     Labs partially complete        HPI:  Parveen Blanc is a 69 y.o. year old male here today for follow-up on severe COPD with chronic respiratory failure and pulmonary nodules. Currently requiring oxygen 24/7.  He is accompanied by his significant other.     Last OV 11/20/19    He quit drinking/smoking during hospital stay May 2019. Former smoker 1 pack daily, 17.5PYH and not interested in quitting.  He was evaluated through [facility] in 2012 for Disability evaluation at which time pulmonary function testing confirmed severe COPD, FEV1 1 L.    Updated PFT 4/11/2018 indicates FEV1 1.04 L or 32% predicted, FEV1/FVC ratio 37, and a DLCO 60% predicted.   He has had progressively worsening exertional dyspnea over the past years, and is now symptomatic with ADLs. He is sedentary most of the day due to his breathing. He is in wheelchair.    Since last hospital stay he has completely stopped ETOH/smoking. He notes breathing to be improved due to this.  He relies on samples at the moment. He has no prescription coverage. He has not met criteria for assistance programs.   He currently using Breo 100mcg 1 puff daily and Spiriva respimat 2.5mcg 2puffs once daily and duoneb nebulizer  4-5x's 1/2 vial daily. We have reviewed appropriate use. He also has Ventolin HFA inhaler he may reach for 1x daily prior to bedtime or when away from home. He tends to avoid public places to avoid colds.  He returned Trilogy and O2 equipment to [facility]. He was intolerant to  Trilogy. He owns his own concentrator and portable O2.   He remains on lasix 20mg q48hrs and potassium 20meq q48hrs. He notes no pedal edema.  He denies any colds since last OV. He has not required abx/steroids in >6mos.    Patient attempted lab work at [facility], but kidney function is not available for review.  Potassium level is 5.1.  Alkaline phosphate is 118.  A1c is 7.4.  Encourage patient reviews use with primary care right away. We will attempt cmp for kidney function due to continued diuretic use.    He is using IS multiple times per day and obtaining >2500cc on a regular basis. He continues to complain of cough with productive clear/white phlegm. He notes rxn to mariela sidhu of h/a's in the past. He is able to clear sputum well with neb use.     Last hospital stay [facility] 4/25/19-5/3/19 for ventilator dependent respiratory failure secondary to streptococcal pneumonia, sepsis due to streptococcal pneumonia, septic shock, and AECOPD.      CT results 7/2/19:  1.  Small to moderate-sized left pleural effusion which obscures the lung parenchyma at the site of 2 previously identified left lower lobe pulmonary nodules.  2.  No new pulmonary nodule.  3.  Stable emphysematous changes with minor peripheral show fibrotic change.  4.  No thoracic adenopathy.  5.  Follow-up CT examination in 3-6 months is recommended in attempt to visualize the left lower lobe.  Results called to patient; denied worsening of dyspnea     Recommended CT scan in 1 year due to stability - due 6/2020. We may want to pursue HRCT, but patient is not interested in ILD work up due to possible cost.  CXR 11/20/19 notes improvement with no acute cardiopulmonary process, hyperinflation noted, interval decrease in LLL pleural effusion. Left basal atelectasis/scarring. Reviewed findings with patient.     Patient declines vaccinations.  BMi 24 - weight stable at 170lbs since 5/24/19.   BMi 25 - 177lbs today.  Advised monitoring daily weights. He  admits to indulging in sugar type foods.       ROS: As per HPI and otherwise negative if not stated.    Past Medical History:   Diagnosis Date   • Asthma    • Back pain    • Bronchitis    • Chickenpox    • Chronic obstructive pulmonary disease (HCC)    • Depression    • Arabic measles    • Hypertension    • Influenza    • Mumps    • Nasal drainage    • Pneumonia    • Pulmonary emphysema (HCC)    • Pulmonary hypertension (HCC)    • Restless leg syndrome    • Tonsillitis        Past Surgical History:   Procedure Laterality Date   • LAMINOTOMY     • TONSILLECTOMY         Family History   Problem Relation Age of Onset   • Diabetes Mother    • Heart Disease Father        Social History     Socioeconomic History   • Marital status: Single     Spouse name: Not on file   • Number of children: Not on file   • Years of education: Not on file   • Highest education level: Not on file   Occupational History   • Not on file   Social Needs   • Financial resource strain: Not on file   • Food insecurity     Worry: Not on file     Inability: Not on file   • Transportation needs     Medical: Not on file     Non-medical: Not on file   Tobacco Use   • Smoking status: Former Smoker     Packs/day: 0.50     Years: 35.00     Pack years: 17.50     Types: Cigarettes     Last attempt to quit: 2019     Years since quittin.8   • Smokeless tobacco: Never Used   Substance and Sexual Activity   • Alcohol use: No     Alcohol/week: 6.0 oz     Types: 10 Cans of beer per week     Comment: stopped after 2019 hospital stay   • Drug use: No   • Sexual activity: Not on file   Lifestyle   • Physical activity     Days per week: Not on file     Minutes per session: Not on file   • Stress: Not on file   Relationships   • Social connections     Talks on phone: Not on file     Gets together: Not on file     Attends Mosque service: Not on file     Active member of club or organization: Not on file     Attends meetings of clubs or organizations: Not  on file     Relationship status: Not on file   • Intimate partner violence     Fear of current or ex partner: Not on file     Emotionally abused: Not on file     Physically abused: Not on file     Forced sexual activity: Not on file   Other Topics Concern   • Not on file   Social History Narrative   • Not on file       Allergies as of 02/26/2020 - Reviewed 02/26/2020   Allergen Reaction Noted   • Codeine Hives 01/26/2016   • Pcn [penicillins] Hives 01/26/2016        Vitals:  @Vital signs for this encounter:    Current medications as of today   Current Outpatient Medications   Medication Sig Dispense Refill   • Fluticasone Furoate-Vilanterol (BREO ELLIPTA) 100-25 MCG/INH AEROSOL POWDER, BREATH ACTIVATED Inhale 1 Puff by mouth every day. Rinse mouth after use. 2 Each 0   • Tiotropium Bromide Monohydrate (SPIRIVA RESPIMAT) 2.5 MCG/ACT Aero Soln Inhale 2 Puffs by mouth every day. 2 Inhaler 0   • potassium Chloride ER (K-TAB) 20 MEQ Tab CR tablet Take 1 Tab by mouth every 48 hours. 60 Tab 5   • furosemide (LASIX) 20 MG Tab Take 1 Tab by mouth every 48 hours. 60 Tab 5   • verapamil ER (CALAN SR) 120 MG CAPSULE SR 24 HR Take 120 mg by mouth every day.     • VENTOLIN  (90 Base) MCG/ACT Aero Soln inhalation aerosol INHALE 1-2 PUFFS EVERY 4-6 HOURS AS NEEDED AND AS DIRECTED.  3   • albuterol (PROVENTIL) 2.5mg/3ml Nebu Soln solution for nebulization USE 1 UNIT DOSE IN NEBULIZER EVERY 4 HOURS AS NEEDED.  11   • Multiple Vitamins-Minerals (CENTRUM SILVER 50+MEN) Tab Take 1 tablet by mouth every day.     • folic acid (FOLVITE) 800 MCG tablet Take 800 mcg by mouth every day.     • calcium carbonate (TUMS) 500 MG Chew Tab Take 1 Tab by mouth every four hours as needed (indigestion). 30 Tab 3   • enalapril (VASOTEC) 10 MG Tab Take 10 mg by mouth every day.     • Glucosamine-Chondroit-Vit C-Mn (GLUCOSAMINE CHONDR 1500 COMPLX PO) Take 2 Tabs by mouth every day.       No current facility-administered medications for this visit.           Physical Exam:   Gen:           Alert and oriented, No apparent distress. Mood and affect appropriate, normal interaction with examiner.  Eyes:          PERRL, EOM intact, sclere white, conjunctive moist.  Ears:          Not examined.   Hearing:     Grossly intact.  Nose:          Normal, no lesions or deformities.  Dentition:    Good dentition.  Oropharynx:   Tongue normal, posterior pharynx without erythema or exudate.  Mallampati Classification: 2/3  Neck:        Supple, trachea midline, no masses.  Respiratory Effort: No intercostal retractions or use of accessory muscles.   Lung Auscultation:      Clear to auscultation bilaterally but diminished t/o; no rales, rhonchi or wheezing.  CV:            Regular rate and rhythm. No murmurs, rubs or gallops.  Abd:           Not examined.   Lymphadenopathy: Not examined.  Gait and Station: Normal.  Digits and Nails: No clubbing, cyanosis, petechiae, or nodes.   Cranial Nerves: II-XII grossly intact.  Skin:        No rashes, lesions or ulcers noted.               Ext:           No cyanosis or edema.      Assessment:  1. Centrilobular emphysema (HCC)     2. Chronic respiratory failure with hypoxia (HCC)     3. Pulmonary nodules     4. Current smoker     5. Hypertension, unspecified type     6. Former smoker     7. BMI 25.0-25.9,adult         Immunizations:    Flu:declines  Pneumovax 23:declines  Prevnar 13:declines    Plan:  1.  COPD is clinically stable if not improved due to his regular incentive spirometer use.  He has been able to maintain his bronchodilator therapy with samples.  He has been compliant with oxygen therapy.  He overall feels his breathing is stable today has not worsened further.  His main complaint is dyspnea with any type of exertion.  Samples given today.  2.  Discussed respiratory hygiene.  3.  Patient declines vaccinations.  4.  CT scan of chest without contrast due June 2020 for continued monitoring of pleural effusion.  5.  Follow-up with  primary care right away to discuss lab work due to elevated A1c.  We will attempt repeating CMP to evaluate kidney function due to continued diuretic use.  Continue Lasix 20 mg every 48 hours and potassium 20 mEq every 48 hours.  6.  Encourage routine walking.  7.  Continue cough/puff techniques along with incentive spirometer use.  May try name brand Mucinex to see if he continues to have headache as a reaction.  8.  Follow-up in 4 months with CT scan of chest to check symptoms, sooner if needed.    Please note that this dictation was created using voice recognition software. I have made every reasonable attempt to correct obvious errors, but it is possible there are errors of grammar and possibly content that I did not discover before finalizing the note.        Electronically signed by JOANIE Martinez  on 02/26/20    Agusto Bhatt M.D.  [address] [place] [address] [id number]  [id number]  [place] [id number]  VIA Facsimile: [phone]

## 2020-02-26 NOTE — PROGRESS NOTES
Chief Complaint   Patient presents with   • Follow-Up     Last seen 11/20/19   • Results     Labs partially complete        HPI:  Parveen Blanc is a 69 y.o. year old male here today for follow-up on severe COPD with chronic respiratory failure and pulmonary nodules. Currently requiring oxygen 24/7.  He is accompanied by his significant other.     Last OV 11/20/19    He quit drinking/smoking during hospital stay May 2019. Former smoker 1 pack daily, 17.5PYH and not interested in quitting.  He was evaluated through [facility] in 2012 for Disability evaluation at which time pulmonary function testing confirmed severe COPD, FEV1 1 L.    Updated PFT 4/11/2018 indicates FEV1 1.04 L or 32% predicted, FEV1/FVC ratio 37, and a DLCO 60% predicted.   He has had progressively worsening exertional dyspnea over the past years, and is now symptomatic with ADLs. He is sedentary most of the day due to his breathing. He is in wheelchair.    Since last hospital stay he has completely stopped ETOH/smoking. He notes breathing to be improved due to this.  He relies on samples at the moment. He has no prescription coverage. He has not met criteria for assistance programs.   He currently using Breo 100mcg 1 puff daily and Spiriva respimat 2.5mcg 2puffs once daily and duoneb nebulizer 4-5x's 1/2 vial daily. We have reviewed appropriate use. He also has Ventolin HFA inhaler he may reach for 1x daily prior to bedtime or when away from home. He tends to avoid public places to avoid colds.  He returned Trilogy and O2 equipment to [facility]. He was intolerant to Trilogy. He owns his own concentrator and portable O2.   He remains on lasix 20mg q48hrs and potassium 20meq q48hrs. He notes no pedal edema.  He denies any colds since last OV. He has not required abx/steroids in >6mos.    Patient attempted lab work at [place], but kidney function is not available for review.  Potassium level is 5.1.  Alkaline phosphate is 118.  A1c is 7.4.  Encourage patient  reviews use with primary care right away. We will attempt cmp for kidney function due to continued diuretic use.    He is using IS multiple times per day and obtaining >2500cc on a regular basis. He continues to complain of cough with productive clear/white phlegm. He notes rxn to mariela sidhu of h/a's in the past. He is able to clear sputum well with neb use.     Last hospital stay [facility] 4/25/19-5/3/19 for ventilator dependent respiratory failure secondary to streptococcal pneumonia, sepsis due to streptococcal pneumonia, septic shock, and AECOPD.      CT results 7/2/19:  1.  Small to moderate-sized left pleural effusion which obscures the lung parenchyma at the site of 2 previously identified left lower lobe pulmonary nodules.  2.  No new pulmonary nodule.  3.  Stable emphysematous changes with minor peripheral show fibrotic change.  4.  No thoracic adenopathy.  5.  Follow-up CT examination in 3-6 months is recommended in attempt to visualize the left lower lobe.  Results called to patient; denied worsening of dyspnea     Recommended CT scan in 1 year due to stability - due 6/2020. We may want to pursue HRCT, but patient is not interested in ILD work up due to possible cost.  CXR 11/20/19 notes improvement with no acute cardiopulmonary process, hyperinflation noted, interval decrease in LLL pleural effusion. Left basal atelectasis/scarring. Reviewed findings with patient.     Patient declines vaccinations.  BMi 24 - weight stable at 170lbs since 5/24/19.   BMi 25 - 177lbs today.  Advised monitoring daily weights. He admits to indulging in sugar type foods.       ROS: As per HPI and otherwise negative if not stated.    Past Medical History:   Diagnosis Date   • Asthma    • Back pain    • Bronchitis    • Chickenpox    • Chronic obstructive pulmonary disease (HCC)    • Depression    • Martiniquais measles    • Hypertension    • Influenza    • Mumps    • Nasal drainage    • Pneumonia    • Pulmonary emphysema (HCC)    •  Pulmonary hypertension (HCC)    • Restless leg syndrome    • Tonsillitis        Past Surgical History:   Procedure Laterality Date   • LAMINOTOMY     • TONSILLECTOMY         Family History   Problem Relation Age of Onset   • Diabetes Mother    • Heart Disease Father        Social History     Socioeconomic History   • Marital status: Single     Spouse name: Not on file   • Number of children: Not on file   • Years of education: Not on file   • Highest education level: Not on file   Occupational History   • Not on file   Social Needs   • Financial resource strain: Not on file   • Food insecurity     Worry: Not on file     Inability: Not on file   • Transportation needs     Medical: Not on file     Non-medical: Not on file   Tobacco Use   • Smoking status: Former Smoker     Packs/day: 0.50     Years: 35.00     Pack years: 17.50     Types: Cigarettes     Last attempt to quit: 2019     Years since quittin.8   • Smokeless tobacco: Never Used   Substance and Sexual Activity   • Alcohol use: No     Alcohol/week: 6.0 oz     Types: 10 Cans of beer per week     Comment: stopped after 2019 hospital stay   • Drug use: No   • Sexual activity: Not on file   Lifestyle   • Physical activity     Days per week: Not on file     Minutes per session: Not on file   • Stress: Not on file   Relationships   • Social connections     Talks on phone: Not on file     Gets together: Not on file     Attends Holiness service: Not on file     Active member of club or organization: Not on file     Attends meetings of clubs or organizations: Not on file     Relationship status: Not on file   • Intimate partner violence     Fear of current or ex partner: Not on file     Emotionally abused: Not on file     Physically abused: Not on file     Forced sexual activity: Not on file   Other Topics Concern   • Not on file   Social History Narrative   • Not on file       Allergies as of 2020 - Reviewed 2020   Allergen Reaction Noted   •  Codeine Hives 01/26/2016   • Pcn [penicillins] Hives 01/26/2016        Vitals:  @Vital signs for this encounter:    Current medications as of today   Current Outpatient Medications   Medication Sig Dispense Refill   • Fluticasone Furoate-Vilanterol (BREO ELLIPTA) 100-25 MCG/INH AEROSOL POWDER, BREATH ACTIVATED Inhale 1 Puff by mouth every day. Rinse mouth after use. 2 Each 0   • Tiotropium Bromide Monohydrate (SPIRIVA RESPIMAT) 2.5 MCG/ACT Aero Soln Inhale 2 Puffs by mouth every day. 2 Inhaler 0   • potassium Chloride ER (K-TAB) 20 MEQ Tab CR tablet Take 1 Tab by mouth every 48 hours. 60 Tab 5   • furosemide (LASIX) 20 MG Tab Take 1 Tab by mouth every 48 hours. 60 Tab 5   • verapamil ER (CALAN SR) 120 MG CAPSULE SR 24 HR Take 120 mg by mouth every day.     • VENTOLIN  (90 Base) MCG/ACT Aero Soln inhalation aerosol INHALE 1-2 PUFFS EVERY 4-6 HOURS AS NEEDED AND AS DIRECTED.  3   • albuterol (PROVENTIL) 2.5mg/3ml Nebu Soln solution for nebulization USE 1 UNIT DOSE IN NEBULIZER EVERY 4 HOURS AS NEEDED.  11   • Multiple Vitamins-Minerals (CENTRUM SILVER 50+MEN) Tab Take 1 tablet by mouth every day.     • folic acid (FOLVITE) 800 MCG tablet Take 800 mcg by mouth every day.     • calcium carbonate (TUMS) 500 MG Chew Tab Take 1 Tab by mouth every four hours as needed (indigestion). 30 Tab 3   • enalapril (VASOTEC) 10 MG Tab Take 10 mg by mouth every day.     • Glucosamine-Chondroit-Vit C-Mn (GLUCOSAMINE CHONDR 1500 COMPLX PO) Take 2 Tabs by mouth every day.       No current facility-administered medications for this visit.          Physical Exam:   Gen:           Alert and oriented, No apparent distress. Mood and affect appropriate, normal interaction with examiner.  Eyes:          PERRL, EOM intact, sclere white, conjunctive moist.  Ears:          Not examined.   Hearing:     Grossly intact.  Nose:          Normal, no lesions or deformities.  Dentition:    Good dentition.  Oropharynx:   Tongue normal, posterior  pharynx without erythema or exudate.  Mallampati Classification: 2/3  Neck:        Supple, trachea midline, no masses.  Respiratory Effort: No intercostal retractions or use of accessory muscles.   Lung Auscultation:      Clear to auscultation bilaterally but diminished t/o; no rales, rhonchi or wheezing.  CV:            Regular rate and rhythm. No murmurs, rubs or gallops.  Abd:           Not examined.   Lymphadenopathy: Not examined.  Gait and Station: Normal.  Digits and Nails: No clubbing, cyanosis, petechiae, or nodes.   Cranial Nerves: II-XII grossly intact.  Skin:        No rashes, lesions or ulcers noted.               Ext:           No cyanosis or edema.      Assessment:  1. Centrilobular emphysema (HCC)     2. Chronic respiratory failure with hypoxia (HCC)     3. Pulmonary nodules     4. Current smoker     5. Hypertension, unspecified type     6. Former smoker     7. BMI 25.0-25.9,adult         Immunizations:    Flu:declines  Pneumovax 23:declines  Prevnar 13:declines    Plan:  1.  COPD is clinically stable if not improved due to his regular incentive spirometer use.  He has been able to maintain his bronchodilator therapy with samples.  He has been compliant with oxygen therapy.  He overall feels his breathing is stable today has not worsened further.  His main complaint is dyspnea with any type of exertion.  Samples given today.  2.  Discussed respiratory hygiene.  3.  Patient declines vaccinations.  4.  CT scan of chest without contrast due June 2020 for continued monitoring of pleural effusion.  5.  Follow-up with primary care right away to discuss lab work due to elevated A1c.  We will attempt repeating CMP to evaluate kidney function due to continued diuretic use.  Continue Lasix 20 mg every 48 hours and potassium 20 mEq every 48 hours.  6.  Encourage routine walking.  7.  Continue cough/puff techniques along with incentive spirometer use.  May try name brand Mucinex to see if he continues to have  headache as a reaction.  8.  Follow-up in 4 months with CT scan of chest to check symptoms, sooner if needed.    Please note that this dictation was created using voice recognition software. I have made every reasonable attempt to correct obvious errors, but it is possible there are errors of grammar and possibly content that I did not discover before finalizing the note.

## 2020-03-04 ENCOUNTER — HOSPITAL ENCOUNTER (OUTPATIENT)
Dept: LAB | Facility: MEDICAL CENTER | Age: 70
End: 2020-03-04
Attending: NURSE PRACTITIONER
Payer: MEDICARE

## 2020-03-04 DIAGNOSIS — Z51.81 MEDICATION MONITORING ENCOUNTER: ICD-10-CM

## 2020-03-04 LAB
ALBUMIN SERPL BCP-MCNC: 3.8 G/DL (ref 3.2–4.9)
ALBUMIN/GLOB SERPL: 1.2 G/DL
ALP SERPL-CCNC: 105 U/L (ref 30–99)
ALT SERPL-CCNC: 17 U/L (ref 2–50)
ANION GAP SERPL CALC-SCNC: 7 MMOL/L (ref 0–11.9)
AST SERPL-CCNC: 16 U/L (ref 12–45)
BASOPHILS # BLD AUTO: 0.7 % (ref 0–1.8)
BASOPHILS # BLD: 0.05 K/UL (ref 0–0.12)
BILIRUB SERPL-MCNC: 0.8 MG/DL (ref 0.1–1.5)
BUN SERPL-MCNC: 9 MG/DL (ref 8–22)
CALCIUM SERPL-MCNC: 9.5 MG/DL (ref 8.5–10.5)
CHLORIDE SERPL-SCNC: 102 MMOL/L (ref 96–112)
CO2 SERPL-SCNC: 28 MMOL/L (ref 20–33)
CREAT SERPL-MCNC: 1.1 MG/DL (ref 0.5–1.4)
EOSINOPHIL # BLD AUTO: 0.27 K/UL (ref 0–0.51)
EOSINOPHIL NFR BLD: 3.9 % (ref 0–6.9)
ERYTHROCYTE [DISTWIDTH] IN BLOOD BY AUTOMATED COUNT: 46.5 FL (ref 35.9–50)
FASTING STATUS PATIENT QL REPORTED: NORMAL
GLOBULIN SER CALC-MCNC: 3.3 G/DL (ref 1.9–3.5)
GLUCOSE SERPL-MCNC: 165 MG/DL (ref 65–99)
HCT VFR BLD AUTO: 46.7 % (ref 42–52)
HGB BLD-MCNC: 15.6 G/DL (ref 14–18)
IMM GRANULOCYTES # BLD AUTO: 0.03 K/UL (ref 0–0.11)
IMM GRANULOCYTES NFR BLD AUTO: 0.4 % (ref 0–0.9)
LYMPHOCYTES # BLD AUTO: 1.91 K/UL (ref 1–4.8)
LYMPHOCYTES NFR BLD: 27.6 % (ref 22–41)
MCH RBC QN AUTO: 30.7 PG (ref 27–33)
MCHC RBC AUTO-ENTMCNC: 33.4 G/DL (ref 33.7–35.3)
MCV RBC AUTO: 91.9 FL (ref 81.4–97.8)
MONOCYTES # BLD AUTO: 0.47 K/UL (ref 0–0.85)
MONOCYTES NFR BLD AUTO: 6.8 % (ref 0–13.4)
NEUTROPHILS # BLD AUTO: 4.19 K/UL (ref 1.82–7.42)
NEUTROPHILS NFR BLD: 60.6 % (ref 44–72)
NRBC # BLD AUTO: 0 K/UL
NRBC BLD-RTO: 0 /100 WBC
PLATELET # BLD AUTO: 201 K/UL (ref 164–446)
PMV BLD AUTO: 12.4 FL (ref 9–12.9)
POTASSIUM SERPL-SCNC: 5 MMOL/L (ref 3.6–5.5)
PROT SERPL-MCNC: 7.1 G/DL (ref 6–8.2)
RBC # BLD AUTO: 5.08 M/UL (ref 4.7–6.1)
SODIUM SERPL-SCNC: 137 MMOL/L (ref 135–145)
WBC # BLD AUTO: 6.9 K/UL (ref 4.8–10.8)

## 2020-03-04 PROCEDURE — 36415 COLL VENOUS BLD VENIPUNCTURE: CPT

## 2020-03-04 PROCEDURE — 80053 COMPREHEN METABOLIC PANEL: CPT

## 2020-03-04 PROCEDURE — 85025 COMPLETE CBC W/AUTO DIFF WBC: CPT

## 2020-03-05 ENCOUNTER — TELEPHONE (OUTPATIENT)
Dept: PULMONOLOGY | Facility: HOSPICE | Age: 70
End: 2020-03-05

## 2020-03-05 NOTE — TELEPHONE ENCOUNTER
----- Message from JOANIE Martinez sent at 3/5/2020  7:56 AM PST -----  Labs are stable; please forward results to Dr. Bhatt

## 2020-03-06 ENCOUNTER — TELEPHONE (OUTPATIENT)
Dept: PULMONOLOGY | Facility: HOSPICE | Age: 70
End: 2020-03-06

## 2020-03-06 NOTE — TELEPHONE ENCOUNTER
----- Message from JOANIE Martinez sent at 2/26/2020  9:46 AM PST -----  Please get last OV for PCP for review

## 2020-03-25 DIAGNOSIS — J96.90 RESPIRATORY FAILURE, UNSPECIFIED CHRONICITY, UNSPECIFIED WHETHER WITH HYPOXIA OR HYPERCAPNIA (HCC): ICD-10-CM

## 2020-03-25 RX ORDER — TIOTROPIUM BROMIDE INHALATION SPRAY 3.12 UG/1
2 SPRAY, METERED RESPIRATORY (INHALATION) DAILY
Qty: 2 INHALER | Refills: 0 | COMMUNITY
Start: 2020-03-25 | End: 2020-04-08

## 2020-03-25 NOTE — TELEPHONE ENCOUNTER
Pt called in requesting more samples of Breo and Spiriva.     Last Seen 2/26/2020 THOM RUSH   Next OV 6/10/2020 THOM Rush

## 2020-03-26 ENCOUNTER — TELEPHONE (OUTPATIENT)
Dept: PULMONOLOGY | Facility: HOSPICE | Age: 70
End: 2020-03-26

## 2020-04-08 DIAGNOSIS — J44.9 CHRONIC OBSTRUCTIVE PULMONARY DISEASE, UNSPECIFIED COPD TYPE (HCC): ICD-10-CM

## 2020-04-08 DIAGNOSIS — J96.90 RESPIRATORY FAILURE, UNSPECIFIED CHRONICITY, UNSPECIFIED WHETHER WITH HYPOXIA OR HYPERCAPNIA (HCC): ICD-10-CM

## 2020-04-08 NOTE — TELEPHONE ENCOUNTER
We only have the higher dose of Breo at this time: I want not sure you wanted to prescribe that. Last samples were 3/25/20.      Have we ever prescribed this med? Yes.  If yes, what date? 2/26/20    Last OV: 2/26/20    Next OV: 6/10/20    DX: COPD    Medications: Spiriva and BREO

## 2020-04-09 RX ORDER — TIOTROPIUM BROMIDE INHALATION SPRAY 3.12 UG/1
2 SPRAY, METERED RESPIRATORY (INHALATION) DAILY
Qty: 1 INHALER | Refills: 0 | COMMUNITY
Start: 2020-04-09 | End: 2020-06-10

## 2020-04-09 NOTE — TELEPHONE ENCOUNTER
"Ok, I pended Breo 200mcg for signature. \"for our records Breo 200mcg given instead of what he regularly takes: Breo 100mcg we are providing higher dose SAMPLE because we are out of the lower dose\"        Have we ever prescribed this med? No.  If yes, what date?    Last OV: 2/26/20    Next OV: 6/10/20    DX: COPD    Medications: Breo 200mcg    "

## 2020-04-22 ENCOUNTER — TELEPHONE (OUTPATIENT)
Dept: PULMONOLOGY | Facility: HOSPICE | Age: 70
End: 2020-04-22

## 2020-04-22 DIAGNOSIS — J96.90 RESPIRATORY FAILURE, UNSPECIFIED CHRONICITY, UNSPECIFIED WHETHER WITH HYPOXIA OR HYPERCAPNIA (HCC): ICD-10-CM

## 2020-04-22 DIAGNOSIS — J44.9 CHRONIC OBSTRUCTIVE PULMONARY DISEASE, UNSPECIFIED COPD TYPE (HCC): ICD-10-CM

## 2020-04-22 NOTE — TELEPHONE ENCOUNTER
We have incruse to take the place of spiriva if you wish Rx pended for approval.    Have we ever prescribed this med? No.  If yes, what date?     Last OV: 2/26/20    Next OV: 6/10/20  DX: COPD    Medications: INCRUSE to replace spiriva as we are out of spiriva samples.

## 2020-04-22 NOTE — TELEPHONE ENCOUNTER
We are out of spiriva but have Breo  Patient normal takes the 100mcg but last time we gave 200 because we were out. Patient did not qualify for drug assistance, per your previous note of 2/26/20      Requesting Breo 100mcg  Have we ever prescribed this med? Yes.  If yes, what date? 9/4/19    Last OV: 2/26/20    Next OV: 6/10/20    DX: COPD    Medications: Breo 100mcg

## 2020-04-28 ENCOUNTER — HOSPITAL ENCOUNTER (OUTPATIENT)
Dept: LAB | Facility: MEDICAL CENTER | Age: 70
End: 2020-04-28
Attending: FAMILY MEDICINE
Payer: MEDICARE

## 2020-04-28 LAB
CREAT UR-MCNC: 43.63 MG/DL
MICROALBUMIN UR-MCNC: 2.4 MG/DL
MICROALBUMIN/CREAT UR: 55 MG/G (ref 0–30)
PSA SERPL-MCNC: 1.13 NG/ML (ref 0–4)
TSH SERPL DL<=0.005 MIU/L-ACNC: 3.45 UIU/ML (ref 0.38–5.33)

## 2020-04-28 PROCEDURE — 84153 ASSAY OF PSA TOTAL: CPT | Mod: GA

## 2020-04-28 PROCEDURE — 82043 UR ALBUMIN QUANTITATIVE: CPT

## 2020-04-28 PROCEDURE — 36415 COLL VENOUS BLD VENIPUNCTURE: CPT | Mod: GA

## 2020-04-28 PROCEDURE — 84443 ASSAY THYROID STIM HORMONE: CPT

## 2020-04-28 PROCEDURE — 83036 HEMOGLOBIN GLYCOSYLATED A1C: CPT | Mod: GA

## 2020-04-28 PROCEDURE — 82570 ASSAY OF URINE CREATININE: CPT

## 2020-04-29 LAB
EST. AVERAGE GLUCOSE BLD GHB EST-MCNC: 177 MG/DL
HBA1C MFR BLD: 7.8 % (ref 0–5.6)

## 2020-05-04 DIAGNOSIS — J44.9 CHRONIC OBSTRUCTIVE PULMONARY DISEASE, UNSPECIFIED COPD TYPE (HCC): ICD-10-CM

## 2020-05-04 DIAGNOSIS — J96.90 RESPIRATORY FAILURE, UNSPECIFIED CHRONICITY, UNSPECIFIED WHETHER WITH HYPOXIA OR HYPERCAPNIA (HCC): ICD-10-CM

## 2020-05-04 NOTE — TELEPHONE ENCOUNTER
Have we ever prescribed this med? Yes.  If yes, what date? 04/22/2020    Last OV: 02/26/2020- THOM Guillory     Next OV: 06/10/2020- THOM Guillory     DX: Respiratory failure, unspecified chronicity, unspecified whether with hypoxia or hypercapnia (HCC) (J96.90)    Medications:   Requested Prescriptions     Pending Prescriptions Disp Refills   • Umeclidinium Bromide (INCRUSE ELLIPTA) 62.5 MCG/INH AEROSOL POWDER, BREATH ACTIVATED 1 Each 0     Sig: Inhale 1 Puff by mouth every day.   • Fluticasone Furoate-Vilanterol (BREO ELLIPTA) 100-25 MCG/INH AEROSOL POWDER, BREATH ACTIVATED 1 Each 0     Sig: Inhale 1 Puff by mouth every day. Rinse mouth after use.       SAMPLES SAMPLES

## 2020-05-04 NOTE — TELEPHONE ENCOUNTER
Called and informed patient that he will be getting Incruse for one month supply and Breo a 14 day supply

## 2020-05-20 DIAGNOSIS — J44.9 CHRONIC OBSTRUCTIVE PULMONARY DISEASE, UNSPECIFIED COPD TYPE (HCC): ICD-10-CM

## 2020-05-20 DIAGNOSIS — J96.90 RESPIRATORY FAILURE, UNSPECIFIED CHRONICITY, UNSPECIFIED WHETHER WITH HYPOXIA OR HYPERCAPNIA (HCC): ICD-10-CM

## 2020-05-20 NOTE — TELEPHONE ENCOUNTER
Have we ever prescribed this med? Yes.  If yes, what date? 5/4/2020    Last OV: 2/26/2020    Next OV: 6/10/2020    DX: COPD    Medications: Breo and Incruse

## 2020-06-03 ENCOUNTER — HOSPITAL ENCOUNTER (OUTPATIENT)
Dept: RADIOLOGY | Facility: MEDICAL CENTER | Age: 70
End: 2020-06-03
Attending: NURSE PRACTITIONER
Payer: MEDICARE

## 2020-06-03 DIAGNOSIS — J43.9 PULMONARY EMPHYSEMA, UNSPECIFIED EMPHYSEMA TYPE (HCC): ICD-10-CM

## 2020-06-03 DIAGNOSIS — J90 PLEURAL EFFUSION, NOT ELSEWHERE CLASSIFIED: ICD-10-CM

## 2020-06-03 PROCEDURE — 71250 CT THORAX DX C-: CPT

## 2020-06-10 ENCOUNTER — OFFICE VISIT (OUTPATIENT)
Dept: PULMONOLOGY | Facility: HOSPICE | Age: 70
End: 2020-06-10
Payer: MEDICARE

## 2020-06-10 VITALS
WEIGHT: 171 LBS | HEIGHT: 70 IN | DIASTOLIC BLOOD PRESSURE: 76 MMHG | HEART RATE: 105 BPM | OXYGEN SATURATION: 93 % | SYSTOLIC BLOOD PRESSURE: 142 MMHG | BODY MASS INDEX: 24.48 KG/M2 | RESPIRATION RATE: 16 BRPM

## 2020-06-10 DIAGNOSIS — Z87.891 FORMER SMOKER: ICD-10-CM

## 2020-06-10 DIAGNOSIS — J96.11 CHRONIC RESPIRATORY FAILURE WITH HYPOXIA (HCC): Chronic | ICD-10-CM

## 2020-06-10 DIAGNOSIS — R91.8 PULMONARY NODULES: Chronic | ICD-10-CM

## 2020-06-10 DIAGNOSIS — I10 HYPERTENSION, UNSPECIFIED TYPE: ICD-10-CM

## 2020-06-10 DIAGNOSIS — J43.2 CENTRILOBULAR EMPHYSEMA (HCC): Chronic | ICD-10-CM

## 2020-06-10 PROBLEM — F17.200 CURRENT SMOKER: Chronic | Status: RESOLVED | Noted: 2018-02-09 | Resolved: 2020-06-10

## 2020-06-10 PROCEDURE — 99214 OFFICE O/P EST MOD 30 MIN: CPT | Performed by: NURSE PRACTITIONER

## 2020-06-10 ASSESSMENT — FIBROSIS 4 INDEX: FIB4 SCORE: 1.33

## 2020-06-10 NOTE — LETTER
JOANIE Martinez  [facility]   [address],   [facility] [place] [address]  Phone: [phone] - Fax: [phone]           Encounter Date: 6/10/2020  Provider: JOANIE Martinez  Location of Care: [facility] PULMONARY MEDICINE  [id number]      Patient:   Parveen Blanc   MR Number: [id number]   YOB: 1950     PROGRESS NOTE:  Chief Complaint   Patient presents with   • Follow-Up     Last Seen 2/26/2020       HPI:  Parveen Blanc is a 70 y.o. year old male here today for follow-up on severe COPD with chronic respiratory failure and pulmonary nodules. Currently requiring oxygen 24/7.  CT scan results today  He is accompanied by his significant other.      Last OV 2/26/2020     He quit drinking/smoking during hospital stay May 2019. Former smoker 1 pack daily, 17.5PYH.  He was evaluated through [facility] in 2012 for Disability evaluation at which time pulmonary function testing confirmed severe COPD, FEV1 1 L.    Updated PFT 4/11/2018 indicates FEV1 1.04 L or 32% predicted, FEV1/FVC ratio 37, and a DLCO 60% predicted.   He has had progressively worsening exertional dyspnea over the past years, and is now symptomatic with ADLs. He is sedentary most of the day due to his breathing. He is in wheelchair.    Since last hospital stay he has completely stopped ETOH/smoking. He notes breathing to be improved due to this.  Last hospital stay [facility] 4/25/19-5/3/19 for ventilator dependent respiratory failure secondary to streptococcal pneumonia, sepsis due to streptococcal pneumonia, septic shock, and AECOPD.     He relies on samples at the moment. He has no prescription coverage. He has not met criteria for assistance programs.   He currently using Breo 100mcg 1 puff daily and Spiriva respimat 2.5mcg 2puffs once daily and duoneb nebulizer 4-5x's 1/2 vial daily. We have reviewed appropriate use. He also has Ventolin HFA inhaler he may reach for 1x daily prior  to bedtime or when away from home. He tends to avoid public places to avoid colds.  He has been sheltering in place due to COVID-19.  He returned Trilogy and O[place] equipment [place] [facility] He was intolerant to Trilogy. He owns his own concentrator and portable O2.   He remains on lasix 20mg q48hrs and potassium 20meq q48hrs. He notes no pedal edema.  He denies any colds since last OV. He has not required abx/steroids in >6mos.  Patient declines vaccinations.    CT scan 6/3/2020 indicates:  New 5.9 mm left lower lobe nodule.  Other left lower lobe and left upper lobe pulmonary nodules are unchanged compared to prior.  Interval decrease in size of the left pleural effusion. There may be trace residual left pleural fluid and pleural thickening.  Lingula and left lower lobe parenchymal scarring.  Emphysematous changes.  Atherosclerotic plaque including coronary artery calcification.  Mildly enlarged mediastinal lymph nodes are unchanged compared to prior.  Bronchial wall thickening can be seen in the setting of bronchitis.  Findings of cirrhosis and portal hypertension.  Cholelithiasis.  I reviewed findings with patient. Recommend optional annual CT due to recent smoking cessation.    Today he notes being off his Breo inhaler for 2 weeks and his dyspnea to use actually worse.  He continues to have regular cough/clear phlegm.  He is down 7 pounds since last office visit.  He follow-up with PCP at least a year.  He notes bloating and abdominal pain in the last month.  His significant other notes wheezing at times.  He is quite sedentary due to his dyspnea.  He is compliant with his O2 use.  He is requesting more samples today.       ROS: As per HPI and otherwise negative if not stated.    Past Medical History:   Diagnosis Date   • Asthma    • Back pain    • Bronchitis    • Chickenpox    • Chronic obstructive pulmonary disease (HCC)    • Depression    • Botswanan measles    • Hypertension    • Influenza    • Mumps    • Nasal drainage      • Pneumonia    • Pulmonary emphysema (HCC)    • Pulmonary hypertension (HCC)    • Restless leg syndrome    • Tonsillitis        Past Surgical History:   Procedure Laterality Date   • LAMINOTOMY     • TONSILLECTOMY         Family History   Problem Relation Age of Onset   • Diabetes Mother    • Heart Disease Father        Social History     Socioeconomic History   • Marital status:      Spouse name: Not on file   • Number of children: Not on file   • Years of education: Not on file   • Highest education level: Not on file   Occupational History   • Not on file   Social Needs   • Financial resource strain: Not on file   • Food insecurity     Worry: Not on file     Inability: Not on file   • Transportation needs     Medical: Not on file     Non-medical: Not on file   Tobacco Use   • Smoking status: Former Smoker     Packs/day: 0.50     Years: 35.00     Pack years: 17.50     Types: Cigarettes     Last attempt to quit: 2019     Years since quittin.1   • Smokeless tobacco: Never Used   Substance and Sexual Activity   • Alcohol use: No     Alcohol/week: 6.0 oz     Types: 10 Cans of beer per week     Comment: stopped after 2019 hospital stay   • Drug use: No   • Sexual activity: Not on file   Lifestyle   • Physical activity     Days per week: Not on file     Minutes per session: Not on file   • Stress: Not on file   Relationships   • Social connections     Talks on phone: Not on file     Gets together: Not on file     Attends Evangelical service: Not on file     Active member of club or organization: Not on file     Attends meetings of clubs or organizations: Not on file     Relationship status: Not on file   • Intimate partner violence     Fear of current or ex partner: Not on file     Emotionally abused: Not on file     Physically abused: Not on file     Forced sexual activity: Not on file   Other Topics Concern   • Not on file   Social History Narrative   • Not on file       Allergies as of 06/10/2020 -  Reviewed 06/10/2020   Allergen Reaction Noted   • Codeine Hives 01/26/2016   • Pcn [penicillins] Hives 01/26/2016        Vitals:  @Vital signs for this encounter:    Current medications as of today   Current Outpatient Medications   Medication Sig Dispense Refill   • metFORMIN (GLUCOPHAGE) 500 MG Tab Take 500 mg by mouth 2 times a day, with meals.     • Umeclidinium Bromide (INCRUSE ELLIPTA) 62.5 MCG/INH AEROSOL POWDER, BREATH ACTIVATED Inhale 1 Puff by mouth every day. 1 Each 0   • Fluticasone Furoate-Vilanterol (BREO ELLIPTA) 100-25 MCG/INH AEROSOL POWDER, BREATH ACTIVATED Inhale 1 Puff by mouth every day. Rinse mouth after use. 1 Each 0   • ipratropium-albuterol (DUONEB) 0.5-2.5 (3) MG/3ML nebulizer solution 3 mL by Nebulization route every four hours as needed for Shortness of Breath (Wheezing). 540 mL 11   • potassium Chloride ER (K-TAB) 20 MEQ Tab CR tablet Take 1 Tab by mouth every 48 hours. 60 Tab 5   • furosemide (LASIX) 20 MG Tab Take 1 Tab by mouth every 48 hours. 60 Tab 5   • verapamil ER (CALAN SR) 120 MG CAPSULE SR 24 HR Take 120 mg by mouth every day.     • VENTOLIN  (90 Base) MCG/ACT Aero Soln inhalation aerosol INHALE 1-2 PUFFS EVERY 4-6 HOURS AS NEEDED AND AS DIRECTED.  3   • Multiple Vitamins-Minerals (CENTRUM SILVER 50+MEN) Tab Take 1 tablet by mouth every day.     • folic acid (FOLVITE) 800 MCG tablet Take 800 mcg by mouth every day.     • calcium carbonate (TUMS) 500 MG Chew Tab Take 1 Tab by mouth every four hours as needed (indigestion). 30 Tab 3   • enalapril (VASOTEC) 10 MG Tab Take 10 mg by mouth every day.     • Glucosamine-Chondroit-Vit C-Mn (GLUCOSAMINE CHONDR 1500 COMPLX PO) Take 2 Tabs by mouth every day.       No current facility-administered medications for this visit.          Physical Exam:   Gen:           Alert and oriented, No apparent distress. Mood and affect appropriate, normal interaction with examiner.  Eyes:          PERRL, EOM intact, sclere white, conjunctive  moist.  Ears:          Not examined.   Hearing:     Grossly intact.  Nose:          Normal, no lesions or deformities.  Dentition:    Good dentition.  Oropharynx:   mask  Mallampati Classification: mask  Neck:        Supple, trachea midline, no masses.  Respiratory Effort: No intercostal retractions or use of accessory muscles.   Lung Auscultation:      Diminished throughout with trace inspiratory wheeze, no rales or rhonchi.  CV:            Regular rate and rhythm. No murmurs, rubs or gallops.  Abd:           Not examined.   Lymphadenopathy: Not examined.  Gait and Station: In wheelchair.  Digits and Nails: No clubbing, cyanosis, petechiae, or nodes.   Cranial Nerves: II-XII grossly intact.  Skin:        No rashes, lesions or ulcers noted.               Ext:           No cyanosis or edema.      Assessment:  1. Centrilobular emphysema (HCC)     2. Chronic respiratory failure with hypoxia (HCC)     3. Pulmonary nodules     4. Hypertension, unspecified type     5. BMI 24.0-24.9, adult     6. Former smoker         Immunizations:    Flu:declines  Pneumovax 23:declines  Prevnar 13:declines    Plan:  1.  Patient is having a flareup of COPD symptoms due to being off bronchodilators for 2 weeks.  He relies on samples.  Patient given samples of Trelegy 1 puff daily and he will stop Incruse at this time.  Continue nebulizer albuterol HFA inhaler as needed.  2.  Follow-up with primary care right away for other health concerns including abdominal pain/bloating.  3.  Continue oxygen 24/7.  4.  Encourage routine walking at a slow pace.  5.  Consider annual CT scan due June 2021.  CT scan report a recent scan to be forwarded to PCP for review.  6.  Follow-up in 2 months for symptom check, sooner if needed.    Please note that this dictation was created using voice recognition software. I have made every reasonable attempt to correct obvious errors, but it is possible there are errors of grammar and possibly content that I did not  discover before finalizing the note.        Electronically signed by JOANIE Martinez  on 06/10/20    Agusto Bhatt M.D.  [address] [place] [address] [id number]  [id number]  [place] [address]  VIA Facsimile: [phone]

## 2020-06-10 NOTE — PROGRESS NOTES
Chief Complaint   Patient presents with   • Follow-Up     Last Seen 2/26/2020       HPI:  Parveen Blanc is a 70 y.o. year old male here today for follow-up on severe COPD with chronic respiratory failure and pulmonary nodules. Currently requiring oxygen 24/7.  CT scan results today  He is accompanied by his significant other.      Last OV 2/26/2020     He quit drinking/smoking during hospital stay May 2019. Former smoker 1 pack daily, 17.5PYH.  He was evaluated through [facility] in 2012 for Disability evaluation at which time pulmonary function testing confirmed severe COPD, FEV1 1 L.    Updated PFT 4/11/2018 indicates FEV1 1.04 L or 32% predicted, FEV1/FVC ratio 37, and a DLCO 60% predicted.   He has had progressively worsening exertional dyspnea over the past years, and is now symptomatic with ADLs. He is sedentary most of the day due to his breathing. He is in wheelchair.    Since last hospital stay he has completely stopped ETOH/smoking. He notes breathing to be improved due to this.  Last hospital stay [facility] 4/25/19-5/3/19 for ventilator dependent respiratory failure secondary to streptococcal pneumonia, sepsis due to streptococcal pneumonia, septic shock, and AECOPD.     He relies on samples at the moment. He has no prescription coverage. He has not met criteria for assistance programs.   He currently using Breo 100mcg 1 puff daily and Spiriva respimat 2.5mcg 2puffs once daily and duoneb nebulizer 4-5x's 1/2 vial daily. We have reviewed appropriate use. He also has Ventolin HFA inhaler he may reach for 1x daily prior to bedtime or when away from home. He tends to avoid public places to avoid colds.  He has been sheltering in place due to COVID-19.  He returned Trilogy and O2 equipment to [facility]. He was intolerant to Trilogy. He owns his own concentrator and portable O2.   He remains on lasix 20mg q48hrs and potassium 20meq q48hrs. He notes no pedal edema.  He denies any colds since last OV. He has not required  abx/steroids in >6mos.  Patient declines vaccinations.    CT scan 6/3/2020 indicates:  New 5.9 mm left lower lobe nodule.  Other left lower lobe and left upper lobe pulmonary nodules are unchanged compared to prior.  Interval decrease in size of the left pleural effusion. There may be trace residual left pleural fluid and pleural thickening.  Lingula and left lower lobe parenchymal scarring.  Emphysematous changes.  Atherosclerotic plaque including coronary artery calcification.  Mildly enlarged mediastinal lymph nodes are unchanged compared to prior.  Bronchial wall thickening can be seen in the setting of bronchitis.  Findings of cirrhosis and portal hypertension.  Cholelithiasis.  I reviewed findings with patient. Recommend optional annual CT due to recent smoking cessation.    Today he notes being off his Breo inhaler for 2 weeks and his dyspnea to use actually worse.  He continues to have regular cough/clear phlegm.  He is down 7 pounds since last office visit.  He follow-up with PCP at least a year.  He notes bloating and abdominal pain in the last month.  His significant other notes wheezing at times.  He is quite sedentary due to his dyspnea.  He is compliant with his O2 use.  He is requesting more samples today.       ROS: As per HPI and otherwise negative if not stated.    Past Medical History:   Diagnosis Date   • Asthma    • Back pain    • Bronchitis    • Chickenpox    • Chronic obstructive pulmonary disease (HCC)    • Depression    • Romansh measles    • Hypertension    • Influenza    • Mumps    • Nasal drainage    • Pneumonia    • Pulmonary emphysema (HCC)    • Pulmonary hypertension (HCC)    • Restless leg syndrome    • Tonsillitis        Past Surgical History:   Procedure Laterality Date   • LAMINOTOMY     • TONSILLECTOMY         Family History   Problem Relation Age of Onset   • Diabetes Mother    • Heart Disease Father        Social History     Socioeconomic History   • Marital status:       Spouse name: Not on file   • Number of children: Not on file   • Years of education: Not on file   • Highest education level: Not on file   Occupational History   • Not on file   Social Needs   • Financial resource strain: Not on file   • Food insecurity     Worry: Not on file     Inability: Not on file   • Transportation needs     Medical: Not on file     Non-medical: Not on file   Tobacco Use   • Smoking status: Former Smoker     Packs/day: 0.50     Years: 35.00     Pack years: 17.50     Types: Cigarettes     Last attempt to quit: 2019     Years since quittin.1   • Smokeless tobacco: Never Used   Substance and Sexual Activity   • Alcohol use: No     Alcohol/week: 6.0 oz     Types: 10 Cans of beer per week     Comment: stopped after 2019 hospital stay   • Drug use: No   • Sexual activity: Not on file   Lifestyle   • Physical activity     Days per week: Not on file     Minutes per session: Not on file   • Stress: Not on file   Relationships   • Social connections     Talks on phone: Not on file     Gets together: Not on file     Attends Church service: Not on file     Active member of club or organization: Not on file     Attends meetings of clubs or organizations: Not on file     Relationship status: Not on file   • Intimate partner violence     Fear of current or ex partner: Not on file     Emotionally abused: Not on file     Physically abused: Not on file     Forced sexual activity: Not on file   Other Topics Concern   • Not on file   Social History Narrative   • Not on file       Allergies as of 06/10/2020 - Reviewed 06/10/2020   Allergen Reaction Noted   • Codeine Hives 2016   • Pcn [penicillins] Hives 2016        Vitals:  @Vital signs for this encounter:    Current medications as of today   Current Outpatient Medications   Medication Sig Dispense Refill   • metFORMIN (GLUCOPHAGE) 500 MG Tab Take 500 mg by mouth 2 times a day, with meals.     • Umeclidinium Bromide (INCRUSE ELLIPTA)  62.5 MCG/INH AEROSOL POWDER, BREATH ACTIVATED Inhale 1 Puff by mouth every day. 1 Each 0   • Fluticasone Furoate-Vilanterol (BREO ELLIPTA) 100-25 MCG/INH AEROSOL POWDER, BREATH ACTIVATED Inhale 1 Puff by mouth every day. Rinse mouth after use. 1 Each 0   • ipratropium-albuterol (DUONEB) 0.5-2.5 (3) MG/3ML nebulizer solution 3 mL by Nebulization route every four hours as needed for Shortness of Breath (Wheezing). 540 mL 11   • potassium Chloride ER (K-TAB) 20 MEQ Tab CR tablet Take 1 Tab by mouth every 48 hours. 60 Tab 5   • furosemide (LASIX) 20 MG Tab Take 1 Tab by mouth every 48 hours. 60 Tab 5   • verapamil ER (CALAN SR) 120 MG CAPSULE SR 24 HR Take 120 mg by mouth every day.     • VENTOLIN  (90 Base) MCG/ACT Aero Soln inhalation aerosol INHALE 1-2 PUFFS EVERY 4-6 HOURS AS NEEDED AND AS DIRECTED.  3   • Multiple Vitamins-Minerals (CENTRUM SILVER 50+MEN) Tab Take 1 tablet by mouth every day.     • folic acid (FOLVITE) 800 MCG tablet Take 800 mcg by mouth every day.     • calcium carbonate (TUMS) 500 MG Chew Tab Take 1 Tab by mouth every four hours as needed (indigestion). 30 Tab 3   • enalapril (VASOTEC) 10 MG Tab Take 10 mg by mouth every day.     • Glucosamine-Chondroit-Vit C-Mn (GLUCOSAMINE CHONDR 1500 COMPLX PO) Take 2 Tabs by mouth every day.       No current facility-administered medications for this visit.          Physical Exam:   Gen:           Alert and oriented, No apparent distress. Mood and affect appropriate, normal interaction with examiner.  Eyes:          PERRL, EOM intact, sclere white, conjunctive moist.  Ears:          Not examined.   Hearing:     Grossly intact.  Nose:          Normal, no lesions or deformities.  Dentition:    Good dentition.  Oropharynx:   mask  Mallampati Classification: mask  Neck:        Supple, trachea midline, no masses.  Respiratory Effort: No intercostal retractions or use of accessory muscles.   Lung Auscultation:      Diminished throughout with trace  inspiratory wheeze, no rales or rhonchi.  CV:            Regular rate and rhythm. No murmurs, rubs or gallops.  Abd:           Not examined.   Lymphadenopathy: Not examined.  Gait and Station: In wheelchair.  Digits and Nails: No clubbing, cyanosis, petechiae, or nodes.   Cranial Nerves: II-XII grossly intact.  Skin:        No rashes, lesions or ulcers noted.               Ext:           No cyanosis or edema.      Assessment:  1. Centrilobular emphysema (HCC)     2. Chronic respiratory failure with hypoxia (HCC)     3. Pulmonary nodules     4. Hypertension, unspecified type     5. BMI 24.0-24.9, adult     6. Former smoker         Immunizations:    Flu:declines  Pneumovax 23:declines  Prevnar 13:declines    Plan:  1.  Patient is having a flareup of COPD symptoms due to being off bronchodilators for 2 weeks.  He relies on samples.  Patient given samples of Trelegy 1 puff daily and he will stop Incruse at this time.  Continue nebulizer albuterol HFA inhaler as needed.  2.  Follow-up with primary care right away for other health concerns including abdominal pain/bloating.  3.  Continue oxygen 24/7.  4.  Encourage routine walking at a slow pace.  5.  Consider annual CT scan due June 2021.  CT scan report a recent scan to be forwarded to PCP for review.  6.  Follow-up in 2 months for symptom check, sooner if needed.    Please note that this dictation was created using voice recognition software. I have made every reasonable attempt to correct obvious errors, but it is possible there are errors of grammar and possibly content that I did not discover before finalizing the note.

## 2020-06-23 DIAGNOSIS — J44.9 CHRONIC OBSTRUCTIVE PULMONARY DISEASE, UNSPECIFIED COPD TYPE (HCC): ICD-10-CM

## 2020-06-23 NOTE — TELEPHONE ENCOUNTER
[facility] to .

## 2020-06-23 NOTE — TELEPHONE ENCOUNTER
Have we ever prescribed this med? Yes.  If yes, what date? 6/10/2020    Last OV: 6/10/2020 Brown    Next OV: 9/9/2020 brown    DX: COPD    Medications: Trelegy sample

## 2020-07-10 DIAGNOSIS — J44.9 CHRONIC OBSTRUCTIVE PULMONARY DISEASE, UNSPECIFIED COPD TYPE (HCC): ICD-10-CM

## 2020-07-10 NOTE — TELEPHONE ENCOUNTER
Have we ever prescribed this med? Yes.  If yes, what date? 6/23/2020    Last OV: 6/10/2020 Brown    Next OV: 9/9/2020 Brown    DX: COPD    Medications: Trelegy sample

## 2020-07-22 DIAGNOSIS — J44.9 CHRONIC OBSTRUCTIVE PULMONARY DISEASE, UNSPECIFIED COPD TYPE (HCC): ICD-10-CM

## 2020-07-22 NOTE — TELEPHONE ENCOUNTER
Pt called in requesting sample of trelegy. He states his current samples will run out on Monday the 27th

## 2020-08-07 DIAGNOSIS — J44.9 CHRONIC OBSTRUCTIVE PULMONARY DISEASE, UNSPECIFIED COPD TYPE (HCC): ICD-10-CM

## 2020-08-21 DIAGNOSIS — J44.9 CHRONIC OBSTRUCTIVE PULMONARY DISEASE, UNSPECIFIED COPD TYPE (HCC): ICD-10-CM

## 2020-08-21 NOTE — TELEPHONE ENCOUNTER
Have we ever prescribed this med? Yes.  If yes, what date? 8/9/2020    Last OV: 6/10/2020Brown    Next OV: 9/9/2020 Brown    DX: COPD    Medications: Trelegy sample

## 2020-08-24 ENCOUNTER — HOSPITAL ENCOUNTER (OUTPATIENT)
Dept: LAB | Facility: MEDICAL CENTER | Age: 70
End: 2020-08-24
Attending: FAMILY MEDICINE
Payer: MEDICARE

## 2020-08-24 LAB
EST. AVERAGE GLUCOSE BLD GHB EST-MCNC: 128 MG/DL
HBA1C MFR BLD: 6.1 % (ref 0–5.6)

## 2020-08-24 PROCEDURE — 83036 HEMOGLOBIN GLYCOSYLATED A1C: CPT | Mod: GA

## 2020-08-24 PROCEDURE — 36415 COLL VENOUS BLD VENIPUNCTURE: CPT | Mod: GA

## 2020-09-02 ENCOUNTER — TELEPHONE (OUTPATIENT)
Dept: PULMONOLOGY | Facility: HOSPICE | Age: 70
End: 2020-09-02

## 2020-09-02 DIAGNOSIS — J44.9 CHRONIC OBSTRUCTIVE PULMONARY DISEASE, UNSPECIFIED COPD TYPE (HCC): ICD-10-CM

## 2020-09-02 NOTE — TELEPHONE ENCOUNTER
Patient calling he will be out of Tregy this coming Monday and is asking for another sample.      Have we ever prescribed this med? Yes.  If yes, what date? 8/24/20    Last OV: 6/10/20    Next OV: 9/9/20    DX: COPD    Medications: sample of Trelegy

## 2020-09-09 ENCOUNTER — OFFICE VISIT (OUTPATIENT)
Dept: PULMONOLOGY | Facility: HOSPICE | Age: 70
End: 2020-09-09
Payer: MEDICARE

## 2020-09-09 VITALS
RESPIRATION RATE: 16 BRPM | HEART RATE: 86 BPM | SYSTOLIC BLOOD PRESSURE: 130 MMHG | BODY MASS INDEX: 24.77 KG/M2 | WEIGHT: 173 LBS | HEIGHT: 70 IN | OXYGEN SATURATION: 96 % | DIASTOLIC BLOOD PRESSURE: 72 MMHG

## 2020-09-09 DIAGNOSIS — I10 HYPERTENSION, UNSPECIFIED TYPE: ICD-10-CM

## 2020-09-09 DIAGNOSIS — J43.2 CENTRILOBULAR EMPHYSEMA (HCC): Chronic | ICD-10-CM

## 2020-09-09 DIAGNOSIS — Z87.891 FORMER SMOKER: ICD-10-CM

## 2020-09-09 DIAGNOSIS — J96.11 CHRONIC RESPIRATORY FAILURE WITH HYPOXIA (HCC): Chronic | ICD-10-CM

## 2020-09-09 DIAGNOSIS — R91.8 PULMONARY NODULES: Chronic | ICD-10-CM

## 2020-09-09 PROCEDURE — 99214 OFFICE O/P EST MOD 30 MIN: CPT | Performed by: NURSE PRACTITIONER

## 2020-09-09 ASSESSMENT — FIBROSIS 4 INDEX: FIB4 SCORE: 1.33
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
## 2020-09-22 DIAGNOSIS — J44.9 CHRONIC OBSTRUCTIVE PULMONARY DISEASE, UNSPECIFIED COPD TYPE (HCC): ICD-10-CM

## 2020-09-22 NOTE — TELEPHONE ENCOUNTER
Have we ever prescribed this med? Yes.  If yes, what date? 9/3/2020    Last OV: 6/10/2020 [facility]    Next OV: Due 8/2020    DX: COPD    Medications: Trelegy sample

## 2020-10-05 DIAGNOSIS — J44.9 CHRONIC OBSTRUCTIVE PULMONARY DISEASE, UNSPECIFIED COPD TYPE (HCC): ICD-10-CM

## 2020-10-05 NOTE — TELEPHONE ENCOUNTER
Pt called and l/m.  Requesting RF Sample: Trelegy    Rx Pended.    Have we ever prescribed this med? Yes.  If yes, what date? 09/22/2020    Last OV: 09/09/2020 with Tierra RUIZ     Return in about 4 months (around 1/9/2021) for Tierra RUIZ, [facility] [place] if needed..      Next OV: No Pending appt.     DX: Chronic obstructive pulmonary disease, unspecified COPD type (HCC) (J44.9    Medications:   Requested Prescriptions     Pending Prescriptions Disp Refills   • Fluticasone-Umeclidin-Vilant (TRELEGY ELLIPTA) 100-62.5-25 MCG/INH AEROSOL POWDER, BREATH ACTIVATED 1 Each 0     Sig: Inhale 1 Puff by mouth every day.

## 2020-10-22 DIAGNOSIS — J44.9 CHRONIC OBSTRUCTIVE PULMONARY DISEASE, UNSPECIFIED COPD TYPE (HCC): ICD-10-CM

## 2020-10-22 NOTE — TELEPHONE ENCOUNTER
Have we ever prescribed this med? Yes.  If yes, what date? 10/6/2020    Last OV: 9/9/2020 [facility]    Next OV: no pending, due 1/2021    DX: COPD    Medications: Trelegy sample

## 2020-10-28 NOTE — TELEPHONE ENCOUNTER
Caller: Parveen    Phone Number: [phone] (home)     Message: Pt called and l/m. RE: Sample of Trelegy. He called last week but never got a call back.          10/28/20: Called and spoke with pt. Notified his sample was approved and ready for .

## 2020-11-12 DIAGNOSIS — J44.9 CHRONIC OBSTRUCTIVE PULMONARY DISEASE, UNSPECIFIED COPD TYPE (HCC): ICD-10-CM

## 2020-11-12 NOTE — TELEPHONE ENCOUNTER
Have we ever prescribed this med? Yes.  If yes, what date? 10/22/2020 Trelegy    Last OV: 9/9/2020 [facility]    Next OV: no pending    DX: COPD    Medications: Beztri sample

## 2020-12-15 ENCOUNTER — TELEPHONE (OUTPATIENT)
Dept: SLEEP MEDICINE | Facility: MEDICAL CENTER | Age: 70
End: 2020-12-15

## 2020-12-15 DIAGNOSIS — J44.9 CHRONIC OBSTRUCTIVE PULMONARY DISEASE, UNSPECIFIED COPD TYPE (HCC): ICD-10-CM

## 2020-12-15 NOTE — TELEPHONE ENCOUNTER
Breztri didn't work for him and would like samples of Trelegy.     Have we ever prescribed this med? Yes.  If yes, what date? BREZTRI 11/12/2020, TRELEGY 10/22/2020    Last OV: 09/09/2020-Brown    Next OV: NONE Due 1/2021    DX: COPD     Medications:   Requested Prescriptions     Pending Prescriptions Disp Refills   • Fluticasone-Umeclidin-Vilant (TRELEGY ELLIPTA) 100-62.5-25 MCG/INH AEROSOL POWDER, BREATH ACTIVATED 1 Each 0     Sig: Inhale 1 Puff every day.

## 2021-02-18 ENCOUNTER — HOSPITAL ENCOUNTER (OUTPATIENT)
Dept: LAB | Facility: MEDICAL CENTER | Age: 71
End: 2021-02-18
Attending: FAMILY MEDICINE
Payer: MEDICARE

## 2021-02-18 LAB
EST. AVERAGE GLUCOSE BLD GHB EST-MCNC: 157 MG/DL
HBA1C MFR BLD: 7.1 % (ref 0–5.6)

## 2021-02-18 PROCEDURE — 83036 HEMOGLOBIN GLYCOSYLATED A1C: CPT | Mod: GA

## 2021-02-18 PROCEDURE — 36415 COLL VENOUS BLD VENIPUNCTURE: CPT | Mod: GA

## 2021-03-03 DIAGNOSIS — Z23 NEED FOR VACCINATION: ICD-10-CM

## 2021-06-11 ENCOUNTER — TELEPHONE (OUTPATIENT)
Dept: SLEEP MEDICINE | Facility: MEDICAL CENTER | Age: 71
End: 2021-06-11

## 2021-06-11 DIAGNOSIS — J44.9 CHRONIC OBSTRUCTIVE PULMONARY DISEASE, UNSPECIFIED COPD TYPE (HCC): ICD-10-CM

## 2021-06-11 NOTE — TELEPHONE ENCOUNTER
Have we ever prescribed this med? Yes.  If yes, what date? 12/15/2020    Last OV: 09/09/2020 - Tierra Guillory    Next OV: was due back 12/2020    DX: COPD    Medications: Trelegy sample

## 2021-06-11 NOTE — TELEPHONE ENCOUNTER
Advised sample up front, [employer] assistance form provided. Scheduled appt July 7th with Maciej Guillory

## 2021-06-11 NOTE — TELEPHONE ENCOUNTER
Temi do you think we could try a PA for Aliyah. PT stated he has been using some from December but ran out. I advised we couldn't provide him with bi weekly samples.

## 2021-06-14 ENCOUNTER — TELEPHONE (OUTPATIENT)
Dept: SLEEP MEDICINE | Facility: MEDICAL CENTER | Age: 71
End: 2021-06-14

## 2021-07-07 ENCOUNTER — OFFICE VISIT (OUTPATIENT)
Dept: SLEEP MEDICINE | Facility: MEDICAL CENTER | Age: 71
End: 2021-07-07
Payer: MEDICARE

## 2021-07-07 VITALS
RESPIRATION RATE: 16 BRPM | DIASTOLIC BLOOD PRESSURE: 60 MMHG | OXYGEN SATURATION: 97 % | HEART RATE: 76 BPM | HEIGHT: 70 IN | BODY MASS INDEX: 24.98 KG/M2 | WEIGHT: 174.5 LBS | SYSTOLIC BLOOD PRESSURE: 118 MMHG

## 2021-07-07 DIAGNOSIS — R91.8 PULMONARY NODULES: Chronic | ICD-10-CM

## 2021-07-07 DIAGNOSIS — J96.11 CHRONIC RESPIRATORY FAILURE WITH HYPOXIA (HCC): Chronic | ICD-10-CM

## 2021-07-07 DIAGNOSIS — J43.2 CENTRILOBULAR EMPHYSEMA (HCC): Chronic | ICD-10-CM

## 2021-07-07 PROCEDURE — 99214 OFFICE O/P EST MOD 30 MIN: CPT | Performed by: NURSE PRACTITIONER

## 2021-07-07 RX ORDER — TOBRAMYCIN AND DEXAMETHASONE 3; 1 MG/ML; MG/ML
SUSPENSION/ DROPS OPHTHALMIC
COMMUNITY
Start: 2021-05-19

## 2021-07-07 ASSESSMENT — COPD QUESTIONNAIRES
MMRC DYSPNEA SCALE: I STOP FOR BREATH AFTER WALKING 100 YARDS OR AFTER A FEW MINUTES ON LEVEL GROUND
QUESTION5_HOMEACTIVITIES: 4
GOLD_GROUP: GOLD GROUP B
CAT_TOTALSCORE: 30
QUESTION1_COUGHFREQUENCY: 4
QUESTION6_LEAVINGHOUSE: 1
QUESTION4_WALKINCLINE: 4
QUESTION2_PHLEGM: 3
QUESTION3_CHESTTIGHTNESS: 2
TOTAL_EXACERBATIONS_PASTYEAR: 0 OR 1 WITHOUT HOSPITALIZATION
QUESTION8_ENERGYLEVEL: 4

## 2021-07-07 ASSESSMENT — FIBROSIS 4 INDEX: FIB4 SCORE: 1.35

## 2021-07-07 NOTE — PATIENT INSTRUCTIONS
1.  Sample given for Trelegy 100.  Also ordered prescription sent to [facility] as patient pays out-of-pocket for prescriptions.  Advised to get chest CT ASAP.  Will follow up in 6 months or sooner if needed.  Patient declined emergency pack prescription at this time.  But he was notified to contact office if he does experience change in breathing or change in purulent sputum production.  Reach out via phone or MyChart for any questions or concerns before next appointment.  Continue taking Trelegy daily and albuterol and DuoNebs as needed.  Continue using oxygen at 3 L/min.  Continue monitoring pulse oximeter and may increase oxygen if O2 saturations drop below 88%.

## 2021-07-07 NOTE — PROGRESS NOTES
Chief Complaint   Patient presents with   • Follow-Up     COPD       HPI:  Parveen Blanc is a 71 y.o. year old male here today for follow-up on COPD.  Seen in office 9/9/2020 by SARA Cardona.  He does have severe COPD with chronic respiratory failure and pulmonary nodules.  He is on oxygen 24/7.    He does complain of a frequent cough with occasional clear phlegm production and occasional chest congestion.  He denies any new or worsening dyspnea but does have to stop to rest frequently with activities such as raking the yard or walking.  He is a former smoker who quit in 2019.  He is currently on Trelegy every other day and Ventolin and duo nebs as needed.  He uses rescue inhalers approximately 3 times daily.  He is using his Trelegy sparingly as he has to pay out-of-pocket for prescriptions and cannot afford Trelegy or many other medical necessities.  He denies any recent hospitalizations or need for emergency packs.  He states his weight is consistent.  He does complain of decreased activity tolerance.  He does have positive orthopnea.  He states he sleeps upright in a recliner.  Does not sleep well due to being uncomfortable.  He denies any recent leg swelling but he was previously on Lasix for edema.  He stopped that dose 2 weeks ago and has no recent complaints of edema.  He denies any recent fevers, chills, change in cough or sputum production, or chest tightness or pain.  He is currently on 3 L pulsed oxygen.    Patient still relying on samples of Trelegy 100. He states that he cannot afford out-of-pocket cost of medications. He does not qualify for financial assistance.    PFT (4/11/2018):  FEV1 1.04 L or 32% predicted, FEV1/FVC ratio 37, and a DLCO 60% predicted.     CT chest (6/3/2020):   New 5.9 mm left lower lobe nodule.  Other left lower lobe and left upper lobe pulmonary nodules are unchanged compared to prior.  Interval decrease in size of the left pleural effusion. There may be trace residual left  pleural fluid and pleural thickening. Lingula and left lower lobe parenchymal scarring. Emphysematous changes.  Atherosclerotic plaque including coronary artery calcification. Mildly enlarged mediastinal lymph nodes are unchanged compared to prior.  Bronchial wall thickening can be seen in the setting of bronchitis. Findings of cirrhosis and portal hypertension. Cholelithiasis.    ROS: As per HPI and otherwise negative if not stated.    Past Medical History:   Diagnosis Date   • Asthma    • Back pain    • Bronchitis    • Chickenpox    • Chronic obstructive pulmonary disease (HCC)    • Depression    • [place] measles    • Hypertension    • Influenza    • Mumps    • Nasal drainage    • Pneumonia    • Pulmonary emphysema (HCC)    • Pulmonary hypertension (HCC)    • Restless leg syndrome    • Tonsillitis        Past Surgical History:   Procedure Laterality Date   • LAMINOTOMY     • TONSILLECTOMY         Family History   Problem Relation Age of Onset   • Diabetes Mother    • Heart Disease Father        Social History     Socioeconomic History   • Marital status:      Spouse name: Not on file   • Number of children: Not on file   • Years of education: Not on file   • Highest education level: Not on file   Occupational History   • Not on file   Tobacco Use   • Smoking status: Former Smoker     Packs/day: 0.50     Years: 35.00     Pack years: 17.50     Types: Cigarettes     Quit date: 2019     Years since quittin.1   • Smokeless tobacco: Never Used   Substance and Sexual Activity   • Alcohol use: No     Alcohol/week: 6.0 oz     Types: 10 Cans of beer per week     Comment: stopped after 2019 hospital stay   • Drug use: No   • Sexual activity: Not on file   Other Topics Concern   • Not on file   Social History Narrative   • Not on file     Social Determinants of Health     Financial Resource Strain:    • Difficulty of Paying Living Expenses:    Food Insecurity:    • Worried About Running Out of Food in the  "Last Year:    • Ran Out of Food in the Last Year:    Transportation Needs:    • Lack of Transportation (Medical):    • Lack of Transportation (Non-Medical):    Physical Activity:    • Days of Exercise per Week:    • Minutes of Exercise per Session:    Stress:    • Feeling of Stress :    Social Connections:    • Frequency of Communication with Friends and Family:    • Frequency of Social Gatherings with Friends and Family:    • Attends Sikhism Services:    • Active Member of Clubs or Organizations:    • Attends Club or Organization Meetings:    • Marital Status:    Intimate Partner Violence:    • Fear of Current or Ex-Partner:    • Emotionally Abused:    • Physically Abused:    • Sexually Abused:        Allergies as of 07/07/2021 - Reviewed 07/07/2021   Allergen Reaction Noted   • Levofloxacin Hives 07/07/2021   • Codeine Hives 01/26/2016   • Pcn [penicillins] Hives 01/26/2016        Vitals:  /60 (BP Location: Left arm, Patient Position: Sitting, BP Cuff Size: Adult)   Pulse 76   Resp 16   Ht 1.778 m (5' 10\")   Wt 79.2 kg (174 lb 8 oz)   SpO2 97%     Current medications as of today   Current Outpatient Medications   Medication Sig Dispense Refill   • Fluticasone-Umeclidin-Vilant (TRELEGY ELLIPTA) 100-62.5-25 MCG/INH AEROSOL POWDER, BREATH ACTIVATED Inhale 1 Puff every day. 2 Each 0   • Fluticasone-Umeclidin-Vilant (TRELEGY ELLIPTA) 100-62.5-25 MCG/INH AEROSOL POWDER, BREATH ACTIVATED Inhale 1 Puff every day. 1 Each 12   • metFORMIN (GLUCOPHAGE) 500 MG Tab Take 500 mg by mouth 2 times a day, with meals.     • ipratropium-albuterol (DUONEB) 0.5-2.5 (3) MG/3ML nebulizer solution 3 mL by Nebulization route every four hours as needed for Shortness of Breath (Wheezing). 540 mL 11   • furosemide (LASIX) 20 MG Tab Take 1 Tab by mouth every 48 hours. 60 Tab 5   • VENTOLIN  (90 Base) MCG/ACT Aero Soln inhalation aerosol INHALE 1-2 PUFFS EVERY 4-6 HOURS AS NEEDED AND AS DIRECTED.  3   • Multiple " Vitamins-Minerals (CENTRUM SILVER 50+MEN) Tab Take 1 tablet by mouth every day.     • folic acid (FOLVITE) 800 MCG tablet Take 800 mcg by mouth every day.     • calcium carbonate (TUMS) 500 MG Chew Tab Take 1 Tab by mouth every four hours as needed (indigestion). 30 Tab 3   • enalapril (VASOTEC) 10 MG Tab Take 10 mg by mouth every day.     • Glucosamine-Chondroit-Vit C-Mn (GLUCOSAMINE CHONDR 1500 COMPLX PO) Take 2 Tabs by mouth every day.     • tobramycin-dexamethasone (TOBRADEX) 0.3-0.1 % Suspension INSTILL 1 DROP INTO AFFECTED EYE TWICE DAILY AS DIRECTED (Patient not taking: Reported on 7/7/2021)     • potassium Chloride ER (K-TAB) 20 MEQ Tab CR tablet Take 1 Tab by mouth every 48 hours. (Patient not taking: Reported on 7/7/2021) 60 Tab 5   • verapamil ER (CALAN SR) 120 MG CAPSULE SR 24 HR Take 120 mg by mouth every day. (Patient not taking: Reported on 7/7/2021)       No current facility-administered medications for this visit.         Physical Exam:   Gen:           Alert and oriented, No apparent distress. Mood and affect appropriate, normal interaction with examiner.  Eyes:          PERRL, EOM intact, sclere white, conjunctive moist.  Ears:          Not examined. No lesions or deformities.  Hearing:     Grossly intact.  Nose:          Normal, no lesions or deformities.  Dentition:    Good dentition.  Oropharynx:   Tongue normal, posterior pharynx without erythema or exudate.  Neck:        Supple, trachea midline, no masses.  Respiratory Effort: No intercostal retractions or use of accessory muscles.   Lung Auscultation:      Diminished but clear to auscultation bilaterally; no rales, rhonchi or wheezing.  CV:            Regular rate and rhythm. No murmurs, rubs or gallops.  Abd:           Not examined. Soft non tender, non distended. Normal active bowel sounds. No masses.  Lymphadenopathy: No palpable nodes or edema.  Gait and Station: Normal.  Digits and Nails: No clubbing, cyanosis, petechiae, or nodes.    Cranial Nerves: II-XII grossly intact.  Skin:        No rashes, lesions or ulcers noted.               Ext:           No cyanosis or edema.      Assessment:  1. Centrilobular emphysema (HCC)     2. Chronic respiratory failure with hypoxia (HCC)     3. Pulmonary nodules         Immunizations:    COVID-19: Estefani SARS-CoV-22 6/9/2021    Plan:  1.  Sample given for Trelegy 100.  Also ordered prescription sent to [facility] as patient pays out-of-pocket for prescriptions.  Advised to get chest CT ASAP.  Will follow up in 6 months or sooner if needed.  Patient declined emergency pack prescription at this time.  But he was notified to contact office if he does experience change in breathing or change in purulent sputum production.  Reach out via phone or MyChart for any questions or concerns before next appointment.  Continue taking Trelegy daily and albuterol and DuoNebs as needed.  Continue using oxygen at 3 L/min.  Continue monitoring pulse oximeter and may increase oxygen if O2 saturations drop below 88%.  2.  Stable on 3 L pulsed O2 with O2 saturation of 97% at rest and with exertion.  3.  Recommended obtaining follow-up CT that was ordered previously.  Patient stated he will get this done ASAP.  We will review results and notify patient if needed.    Please note that this dictation was created using voice recognition software. I have made every reasonable attempt to correct obvious errors, but it is possible there are errors of grammar and possibly content that I did not discover before finalizing the note.

## 2021-07-20 ENCOUNTER — HOSPITAL ENCOUNTER (OUTPATIENT)
Dept: LAB | Facility: MEDICAL CENTER | Age: 71
End: 2021-07-20
Attending: FAMILY MEDICINE
Payer: MEDICARE

## 2021-07-20 ENCOUNTER — HOSPITAL ENCOUNTER (OUTPATIENT)
Dept: RADIOLOGY | Facility: MEDICAL CENTER | Age: 71
End: 2021-07-20
Attending: NURSE PRACTITIONER
Payer: MEDICARE

## 2021-07-20 DIAGNOSIS — Z87.891 FORMER SMOKER: ICD-10-CM

## 2021-07-20 DIAGNOSIS — R91.8 PULMONARY NODULES: ICD-10-CM

## 2021-07-20 DIAGNOSIS — J43.2 CENTRILOBULAR EMPHYSEMA (HCC): ICD-10-CM

## 2021-07-20 LAB
ALBUMIN SERPL BCP-MCNC: 4 G/DL (ref 3.2–4.9)
ALBUMIN/GLOB SERPL: 1.3 G/DL
ALP SERPL-CCNC: 97 U/L (ref 30–99)
ALT SERPL-CCNC: 14 U/L (ref 2–50)
ANION GAP SERPL CALC-SCNC: 10 MMOL/L (ref 7–16)
APPEARANCE UR: CLEAR
AST SERPL-CCNC: 14 U/L (ref 12–45)
BASOPHILS # BLD AUTO: 0.7 % (ref 0–1.8)
BASOPHILS # BLD: 0.04 K/UL (ref 0–0.12)
BILIRUB SERPL-MCNC: 0.6 MG/DL (ref 0.1–1.5)
BILIRUB UR QL STRIP.AUTO: NEGATIVE
BUN SERPL-MCNC: 11 MG/DL (ref 8–22)
CALCIUM SERPL-MCNC: 9.3 MG/DL (ref 8.5–10.5)
CHLORIDE SERPL-SCNC: 102 MMOL/L (ref 96–112)
CHOLEST SERPL-MCNC: 171 MG/DL (ref 100–199)
CK SERPL-CCNC: 40 U/L (ref 0–154)
CO2 SERPL-SCNC: 26 MMOL/L (ref 20–33)
COLOR UR: YELLOW
CREAT SERPL-MCNC: 0.96 MG/DL (ref 0.5–1.4)
EOSINOPHIL # BLD AUTO: 0.14 K/UL (ref 0–0.51)
EOSINOPHIL NFR BLD: 2.4 % (ref 0–6.9)
ERYTHROCYTE [DISTWIDTH] IN BLOOD BY AUTOMATED COUNT: 47.4 FL (ref 35.9–50)
EST. AVERAGE GLUCOSE BLD GHB EST-MCNC: 151 MG/DL
FASTING STATUS PATIENT QL REPORTED: NORMAL
GLOBULIN SER CALC-MCNC: 3.1 G/DL (ref 1.9–3.5)
GLUCOSE SERPL-MCNC: 144 MG/DL (ref 65–99)
GLUCOSE UR STRIP.AUTO-MCNC: NEGATIVE MG/DL
HBA1C MFR BLD: 6.9 % (ref 4–5.6)
HCT VFR BLD AUTO: 41.7 % (ref 42–52)
HDLC SERPL-MCNC: 36 MG/DL
HGB BLD-MCNC: 13.5 G/DL (ref 14–18)
IMM GRANULOCYTES # BLD AUTO: 0.04 K/UL (ref 0–0.11)
IMM GRANULOCYTES NFR BLD AUTO: 0.7 % (ref 0–0.9)
KETONES UR STRIP.AUTO-MCNC: NEGATIVE MG/DL
LDLC SERPL CALC-MCNC: 114 MG/DL
LEUKOCYTE ESTERASE UR QL STRIP.AUTO: NEGATIVE
LYMPHOCYTES # BLD AUTO: 1.48 K/UL (ref 1–4.8)
LYMPHOCYTES NFR BLD: 24.9 % (ref 22–41)
MCH RBC QN AUTO: 29.8 PG (ref 27–33)
MCHC RBC AUTO-ENTMCNC: 32.4 G/DL (ref 33.7–35.3)
MCV RBC AUTO: 92.1 FL (ref 81.4–97.8)
MICRO URNS: NORMAL
MONOCYTES # BLD AUTO: 0.49 K/UL (ref 0–0.85)
MONOCYTES NFR BLD AUTO: 8.2 % (ref 0–13.4)
NEUTROPHILS # BLD AUTO: 3.75 K/UL (ref 1.82–7.42)
NEUTROPHILS NFR BLD: 63.1 % (ref 44–72)
NITRITE UR QL STRIP.AUTO: NEGATIVE
NRBC # BLD AUTO: 0 K/UL
NRBC BLD-RTO: 0 /100 WBC
PH UR STRIP.AUTO: 7 [PH] (ref 5–8)
PLATELET # BLD AUTO: 222 K/UL (ref 164–446)
PMV BLD AUTO: 12.6 FL (ref 9–12.9)
POTASSIUM SERPL-SCNC: 4.8 MMOL/L (ref 3.6–5.5)
PROT SERPL-MCNC: 7.1 G/DL (ref 6–8.2)
PROT UR QL STRIP: NEGATIVE MG/DL
PSA SERPL-MCNC: 0.33 NG/ML (ref 0–4)
RBC # BLD AUTO: 4.53 M/UL (ref 4.7–6.1)
RBC UR QL AUTO: NEGATIVE
SODIUM SERPL-SCNC: 138 MMOL/L (ref 135–145)
SP GR UR STRIP.AUTO: 1.01
TRIGL SERPL-MCNC: 104 MG/DL (ref 0–149)
TSH SERPL DL<=0.005 MIU/L-ACNC: 2.16 UIU/ML (ref 0.38–5.33)
UROBILINOGEN UR STRIP.AUTO-MCNC: 0.2 MG/DL
WBC # BLD AUTO: 5.9 K/UL (ref 4.8–10.8)

## 2021-07-20 PROCEDURE — 84443 ASSAY THYROID STIM HORMONE: CPT

## 2021-07-20 PROCEDURE — 83036 HEMOGLOBIN GLYCOSYLATED A1C: CPT | Mod: GA

## 2021-07-20 PROCEDURE — 36415 COLL VENOUS BLD VENIPUNCTURE: CPT

## 2021-07-20 PROCEDURE — 82550 ASSAY OF CK (CPK): CPT

## 2021-07-20 PROCEDURE — 84153 ASSAY OF PSA TOTAL: CPT

## 2021-07-20 PROCEDURE — 80053 COMPREHEN METABOLIC PANEL: CPT

## 2021-07-20 PROCEDURE — 80061 LIPID PANEL: CPT

## 2021-07-20 PROCEDURE — 85025 COMPLETE CBC W/AUTO DIFF WBC: CPT

## 2021-07-20 PROCEDURE — 71250 CT THORAX DX C-: CPT | Mod: ME

## 2021-07-20 PROCEDURE — 81003 URINALYSIS AUTO W/O SCOPE: CPT

## 2021-07-21 ENCOUNTER — TELEPHONE (OUTPATIENT)
Dept: SLEEP MEDICINE | Facility: MEDICAL CENTER | Age: 71
End: 2021-07-21

## 2021-07-21 NOTE — TELEPHONE ENCOUNTER
----- Message from JOANIE Martinez sent at 7/20/2021  1:05 PM PDT -----  Please let patient and significant other know is CT chest is stable; no new findings. F/u in 6 mos per NETTE Guillory's last OV note.

## 2022-09-14 ENCOUNTER — HOSPITAL ENCOUNTER (OUTPATIENT)
Dept: LAB | Facility: MEDICAL CENTER | Age: 72
End: 2022-09-14
Attending: FAMILY MEDICINE
Payer: MEDICARE

## 2022-09-14 LAB
ALBUMIN SERPL BCP-MCNC: 4.1 G/DL (ref 3.2–4.9)
ALBUMIN/GLOB SERPL: 1.4 G/DL
ALP SERPL-CCNC: 88 U/L (ref 30–99)
ALT SERPL-CCNC: 13 U/L (ref 2–50)
ANION GAP SERPL CALC-SCNC: 8 MMOL/L (ref 7–16)
APPEARANCE UR: CLEAR
AST SERPL-CCNC: 16 U/L (ref 12–45)
BASOPHILS # BLD AUTO: 0.9 % (ref 0–1.8)
BASOPHILS # BLD: 0.06 K/UL (ref 0–0.12)
BILIRUB SERPL-MCNC: 0.6 MG/DL (ref 0.1–1.5)
BILIRUB UR QL STRIP.AUTO: NEGATIVE
BUN SERPL-MCNC: 11 MG/DL (ref 8–22)
CALCIUM SERPL-MCNC: 9.2 MG/DL (ref 8.5–10.5)
CHLORIDE SERPL-SCNC: 101 MMOL/L (ref 96–112)
CHOLEST SERPL-MCNC: 187 MG/DL (ref 100–199)
CO2 SERPL-SCNC: 25 MMOL/L (ref 20–33)
COLOR UR: YELLOW
CREAT SERPL-MCNC: 0.91 MG/DL (ref 0.5–1.4)
EOSINOPHIL # BLD AUTO: 0.13 K/UL (ref 0–0.51)
EOSINOPHIL NFR BLD: 1.9 % (ref 0–6.9)
ERYTHROCYTE [DISTWIDTH] IN BLOOD BY AUTOMATED COUNT: 44 FL (ref 35.9–50)
EST. AVERAGE GLUCOSE BLD GHB EST-MCNC: 163 MG/DL
FASTING STATUS PATIENT QL REPORTED: NORMAL
GFR SERPLBLD CREATININE-BSD FMLA CKD-EPI: 90 ML/MIN/1.73 M 2
GLOBULIN SER CALC-MCNC: 2.9 G/DL (ref 1.9–3.5)
GLUCOSE SERPL-MCNC: 151 MG/DL (ref 65–99)
GLUCOSE UR STRIP.AUTO-MCNC: NEGATIVE MG/DL
HBA1C MFR BLD: 7.3 % (ref 4–5.6)
HCT VFR BLD AUTO: 39.7 % (ref 42–52)
HDLC SERPL-MCNC: 32 MG/DL
HGB BLD-MCNC: 13.7 G/DL (ref 14–18)
IMM GRANULOCYTES # BLD AUTO: 0.03 K/UL (ref 0–0.11)
IMM GRANULOCYTES NFR BLD AUTO: 0.4 % (ref 0–0.9)
KETONES UR STRIP.AUTO-MCNC: NEGATIVE MG/DL
LDLC SERPL CALC-MCNC: 132 MG/DL
LEUKOCYTE ESTERASE UR QL STRIP.AUTO: NEGATIVE
LYMPHOCYTES # BLD AUTO: 1.32 K/UL (ref 1–4.8)
LYMPHOCYTES NFR BLD: 19.2 % (ref 22–41)
MCH RBC QN AUTO: 31.1 PG (ref 27–33)
MCHC RBC AUTO-ENTMCNC: 34.5 G/DL (ref 33.7–35.3)
MCV RBC AUTO: 90 FL (ref 81.4–97.8)
MICRO URNS: NORMAL
MONOCYTES # BLD AUTO: 0.61 K/UL (ref 0–0.85)
MONOCYTES NFR BLD AUTO: 8.9 % (ref 0–13.4)
NEUTROPHILS # BLD AUTO: 4.71 K/UL (ref 1.82–7.42)
NEUTROPHILS NFR BLD: 68.7 % (ref 44–72)
NITRITE UR QL STRIP.AUTO: NEGATIVE
NRBC # BLD AUTO: 0 K/UL
NRBC BLD-RTO: 0 /100 WBC
NT-PROBNP SERPL IA-MCNC: 20 PG/ML (ref 0–125)
PH UR STRIP.AUTO: 6.5 [PH] (ref 5–8)
PLATELET # BLD AUTO: 244 K/UL (ref 164–446)
PMV BLD AUTO: 11.4 FL (ref 9–12.9)
POTASSIUM SERPL-SCNC: 4.7 MMOL/L (ref 3.6–5.5)
PROT SERPL-MCNC: 7 G/DL (ref 6–8.2)
PROT UR QL STRIP: NEGATIVE MG/DL
PSA SERPL-MCNC: 0.43 NG/ML (ref 0–4)
RBC # BLD AUTO: 4.41 M/UL (ref 4.7–6.1)
RBC UR QL AUTO: NEGATIVE
SODIUM SERPL-SCNC: 134 MMOL/L (ref 135–145)
SP GR UR STRIP.AUTO: 1.01
TRIGL SERPL-MCNC: 115 MG/DL (ref 0–149)
TSH SERPL DL<=0.005 MIU/L-ACNC: 1.48 UIU/ML (ref 0.38–5.33)
UROBILINOGEN UR STRIP.AUTO-MCNC: 0.2 MG/DL
WBC # BLD AUTO: 6.9 K/UL (ref 4.8–10.8)

## 2022-09-14 PROCEDURE — 80061 LIPID PANEL: CPT

## 2022-09-14 PROCEDURE — 80053 COMPREHEN METABOLIC PANEL: CPT

## 2022-09-14 PROCEDURE — 83036 HEMOGLOBIN GLYCOSYLATED A1C: CPT | Mod: GA

## 2022-09-14 PROCEDURE — 81003 URINALYSIS AUTO W/O SCOPE: CPT

## 2022-09-14 PROCEDURE — 84443 ASSAY THYROID STIM HORMONE: CPT

## 2022-09-14 PROCEDURE — 84153 ASSAY OF PSA TOTAL: CPT

## 2022-09-14 PROCEDURE — 85025 COMPLETE CBC W/AUTO DIFF WBC: CPT

## 2022-09-14 PROCEDURE — 83880 ASSAY OF NATRIURETIC PEPTIDE: CPT | Mod: GA

## 2022-09-14 PROCEDURE — 36415 COLL VENOUS BLD VENIPUNCTURE: CPT | Mod: GA

## 2022-12-21 ENCOUNTER — HOSPITAL ENCOUNTER (OUTPATIENT)
Dept: LAB | Facility: MEDICAL CENTER | Age: 72
End: 2022-12-21
Attending: FAMILY MEDICINE
Payer: MEDICARE

## 2022-12-21 LAB
BASOPHILS # BLD AUTO: 0.6 % (ref 0–1.8)
BASOPHILS # BLD: 0.04 K/UL (ref 0–0.12)
EOSINOPHIL # BLD AUTO: 0.13 K/UL (ref 0–0.51)
EOSINOPHIL NFR BLD: 2 % (ref 0–6.9)
ERYTHROCYTE [DISTWIDTH] IN BLOOD BY AUTOMATED COUNT: 42.2 FL (ref 35.9–50)
EST. AVERAGE GLUCOSE BLD GHB EST-MCNC: 157 MG/DL
HBA1C MFR BLD: 7.1 % (ref 4–5.6)
HCT VFR BLD AUTO: 40.4 % (ref 42–52)
HGB BLD-MCNC: 13.3 G/DL (ref 14–18)
IMM GRANULOCYTES # BLD AUTO: 0.04 K/UL (ref 0–0.11)
IMM GRANULOCYTES NFR BLD AUTO: 0.6 % (ref 0–0.9)
LYMPHOCYTES # BLD AUTO: 1.43 K/UL (ref 1–4.8)
LYMPHOCYTES NFR BLD: 21.8 % (ref 22–41)
MCH RBC QN AUTO: 29.7 PG (ref 27–33)
MCHC RBC AUTO-ENTMCNC: 32.9 G/DL (ref 33.7–35.3)
MCV RBC AUTO: 90.2 FL (ref 81.4–97.8)
MONOCYTES # BLD AUTO: 0.45 K/UL (ref 0–0.85)
MONOCYTES NFR BLD AUTO: 6.9 % (ref 0–13.4)
NEUTROPHILS # BLD AUTO: 4.47 K/UL (ref 1.82–7.42)
NEUTROPHILS NFR BLD: 68.1 % (ref 44–72)
NRBC # BLD AUTO: 0 K/UL
NRBC BLD-RTO: 0 /100 WBC
PLATELET # BLD AUTO: 240 K/UL (ref 164–446)
PMV BLD AUTO: 11.2 FL (ref 9–12.9)
RBC # BLD AUTO: 4.48 M/UL (ref 4.7–6.1)
WBC # BLD AUTO: 6.6 K/UL (ref 4.8–10.8)

## 2022-12-21 PROCEDURE — 36415 COLL VENOUS BLD VENIPUNCTURE: CPT

## 2022-12-21 PROCEDURE — 85025 COMPLETE CBC W/AUTO DIFF WBC: CPT

## 2022-12-21 PROCEDURE — 83036 HEMOGLOBIN GLYCOSYLATED A1C: CPT | Mod: GA

## 2023-01-06 ENCOUNTER — TELEPHONE (OUTPATIENT)
Dept: HEALTH INFORMATION MANAGEMENT | Facility: OTHER | Age: 73
End: 2023-01-06

## 2023-01-06 NOTE — TELEPHONE ENCOUNTER
Outcome: Left Message for patient to schedule an AWV     Please transfer to [facility] at [phone] when patient returns call.     Attempt # 1  - [place]

## 2023-09-19 ENCOUNTER — HOSPITAL ENCOUNTER (OUTPATIENT)
Dept: LAB | Facility: MEDICAL CENTER | Age: 73
End: 2023-09-19
Attending: FAMILY MEDICINE
Payer: MEDICARE

## 2023-09-19 LAB
ALBUMIN SERPL BCP-MCNC: 4 G/DL (ref 3.2–4.9)
ALBUMIN/GLOB SERPL: 1.3 G/DL
ALP SERPL-CCNC: 107 U/L (ref 30–99)
ALT SERPL-CCNC: 16 U/L (ref 2–50)
ANION GAP SERPL CALC-SCNC: 10 MMOL/L (ref 7–16)
APPEARANCE UR: CLEAR
AST SERPL-CCNC: 21 U/L (ref 12–45)
BASOPHILS # BLD AUTO: 0.7 % (ref 0–1.8)
BASOPHILS # BLD: 0.04 K/UL (ref 0–0.12)
BILIRUB SERPL-MCNC: 0.6 MG/DL (ref 0.1–1.5)
BILIRUB UR QL STRIP.AUTO: NEGATIVE
BUN SERPL-MCNC: 15 MG/DL (ref 8–22)
CALCIUM ALBUM COR SERPL-MCNC: 9.6 MG/DL (ref 8.5–10.5)
CALCIUM SERPL-MCNC: 9.6 MG/DL (ref 8.5–10.5)
CHLORIDE SERPL-SCNC: 102 MMOL/L (ref 96–112)
CHOLEST SERPL-MCNC: 192 MG/DL (ref 100–199)
CO2 SERPL-SCNC: 25 MMOL/L (ref 20–33)
COLOR UR: YELLOW
CREAT SERPL-MCNC: 1.05 MG/DL (ref 0.5–1.4)
EOSINOPHIL # BLD AUTO: 0.13 K/UL (ref 0–0.51)
EOSINOPHIL NFR BLD: 2.4 % (ref 0–6.9)
ERYTHROCYTE [DISTWIDTH] IN BLOOD BY AUTOMATED COUNT: 47.6 FL (ref 35.9–50)
EST. AVERAGE GLUCOSE BLD GHB EST-MCNC: 174 MG/DL
FASTING STATUS PATIENT QL REPORTED: NORMAL
GFR SERPLBLD CREATININE-BSD FMLA CKD-EPI: 75 ML/MIN/1.73 M 2
GLOBULIN SER CALC-MCNC: 3.2 G/DL (ref 1.9–3.5)
GLUCOSE SERPL-MCNC: 156 MG/DL (ref 65–99)
GLUCOSE UR STRIP.AUTO-MCNC: NEGATIVE MG/DL
HBA1C MFR BLD: 7.7 % (ref 4–5.6)
HCT VFR BLD AUTO: 41.2 % (ref 42–52)
HDLC SERPL-MCNC: 32 MG/DL
HGB BLD-MCNC: 13.3 G/DL (ref 14–18)
IMM GRANULOCYTES # BLD AUTO: 0.03 K/UL (ref 0–0.11)
IMM GRANULOCYTES NFR BLD AUTO: 0.5 % (ref 0–0.9)
KETONES UR STRIP.AUTO-MCNC: NEGATIVE MG/DL
LDLC SERPL CALC-MCNC: 133 MG/DL
LEUKOCYTE ESTERASE UR QL STRIP.AUTO: NEGATIVE
LYMPHOCYTES # BLD AUTO: 1.39 K/UL (ref 1–4.8)
LYMPHOCYTES NFR BLD: 25.2 % (ref 22–41)
MCH RBC QN AUTO: 28.4 PG (ref 27–33)
MCHC RBC AUTO-ENTMCNC: 32.3 G/DL (ref 32.3–36.5)
MCV RBC AUTO: 88 FL (ref 81.4–97.8)
MICRO URNS: NORMAL
MONOCYTES # BLD AUTO: 0.46 K/UL (ref 0–0.85)
MONOCYTES NFR BLD AUTO: 8.3 % (ref 0–13.4)
NEUTROPHILS # BLD AUTO: 3.46 K/UL (ref 1.82–7.42)
NEUTROPHILS NFR BLD: 62.9 % (ref 44–72)
NITRITE UR QL STRIP.AUTO: NEGATIVE
NRBC # BLD AUTO: 0 K/UL
NRBC BLD-RTO: 0 /100 WBC (ref 0–0.2)
PH UR STRIP.AUTO: 7 [PH] (ref 5–8)
PLATELET # BLD AUTO: 231 K/UL (ref 164–446)
PMV BLD AUTO: 12 FL (ref 9–12.9)
POTASSIUM SERPL-SCNC: 5.4 MMOL/L (ref 3.6–5.5)
PROT SERPL-MCNC: 7.2 G/DL (ref 6–8.2)
PROT UR QL STRIP: NEGATIVE MG/DL
PSA SERPL-MCNC: 0.33 NG/ML (ref 0–4)
RBC # BLD AUTO: 4.68 M/UL (ref 4.7–6.1)
RBC UR QL AUTO: NEGATIVE
SODIUM SERPL-SCNC: 137 MMOL/L (ref 135–145)
SP GR UR STRIP.AUTO: 1.01
TRIGL SERPL-MCNC: 135 MG/DL (ref 0–149)
TSH SERPL DL<=0.005 MIU/L-ACNC: 1.94 UIU/ML (ref 0.38–5.33)
UROBILINOGEN UR STRIP.AUTO-MCNC: 0.2 MG/DL
WBC # BLD AUTO: 5.5 K/UL (ref 4.8–10.8)

## 2023-09-19 PROCEDURE — 84153 ASSAY OF PSA TOTAL: CPT

## 2023-09-19 PROCEDURE — 84443 ASSAY THYROID STIM HORMONE: CPT

## 2023-09-19 PROCEDURE — 80061 LIPID PANEL: CPT

## 2023-09-19 PROCEDURE — 81003 URINALYSIS AUTO W/O SCOPE: CPT

## 2023-09-19 PROCEDURE — 83036 HEMOGLOBIN GLYCOSYLATED A1C: CPT | Mod: GA

## 2023-09-19 PROCEDURE — 85025 COMPLETE CBC W/AUTO DIFF WBC: CPT

## 2023-09-19 PROCEDURE — 36415 COLL VENOUS BLD VENIPUNCTURE: CPT

## 2023-09-19 PROCEDURE — 80053 COMPREHEN METABOLIC PANEL: CPT

## 2023-10-13 ENCOUNTER — TELEPHONE (OUTPATIENT)
Dept: HEALTH INFORMATION MANAGEMENT | Facility: OTHER | Age: 73
End: 2023-10-13

## 2023-12-22 ENCOUNTER — HOSPITAL ENCOUNTER (OUTPATIENT)
Dept: LAB | Facility: MEDICAL CENTER | Age: 73
End: 2023-12-22
Attending: FAMILY MEDICINE
Payer: MEDICARE

## 2023-12-22 LAB
CHOLEST SERPL-MCNC: 131 MG/DL (ref 100–199)
EST. AVERAGE GLUCOSE BLD GHB EST-MCNC: 180 MG/DL
FASTING STATUS PATIENT QL REPORTED: NORMAL
HBA1C MFR BLD: 7.9 % (ref 4–5.6)
HDLC SERPL-MCNC: 32 MG/DL
LDLC SERPL CALC-MCNC: 78 MG/DL
TRIGL SERPL-MCNC: 104 MG/DL (ref 0–149)

## 2023-12-22 PROCEDURE — 83036 HEMOGLOBIN GLYCOSYLATED A1C: CPT

## 2023-12-22 PROCEDURE — 36415 COLL VENOUS BLD VENIPUNCTURE: CPT

## 2023-12-22 PROCEDURE — 80061 LIPID PANEL: CPT

## 2024-04-03 ENCOUNTER — HOSPITAL ENCOUNTER (OUTPATIENT)
Dept: LAB | Facility: MEDICAL CENTER | Age: 74
End: 2024-04-03
Attending: FAMILY MEDICINE
Payer: MEDICARE

## 2024-04-03 LAB
CHOLEST SERPL-MCNC: 112 MG/DL (ref 100–199)
EST. AVERAGE GLUCOSE BLD GHB EST-MCNC: 166 MG/DL
HBA1C MFR BLD: 7.4 % (ref 4–5.6)
HDLC SERPL-MCNC: 37 MG/DL
LDLC SERPL CALC-MCNC: 61 MG/DL
TRIGL SERPL-MCNC: 68 MG/DL (ref 0–149)

## 2024-04-03 PROCEDURE — 36415 COLL VENOUS BLD VENIPUNCTURE: CPT | Mod: GA

## 2024-04-03 PROCEDURE — 83036 HEMOGLOBIN GLYCOSYLATED A1C: CPT | Mod: GA

## 2024-04-03 PROCEDURE — 80061 LIPID PANEL: CPT

## 2024-10-09 ENCOUNTER — HOSPITAL ENCOUNTER (OUTPATIENT)
Dept: LAB | Facility: MEDICAL CENTER | Age: 74
End: 2024-10-09
Attending: FAMILY MEDICINE
Payer: MEDICARE

## 2024-10-09 LAB
EST. AVERAGE GLUCOSE BLD GHB EST-MCNC: 160 MG/DL
HBA1C MFR BLD: 7.2 % (ref 4–5.6)

## 2024-10-09 PROCEDURE — 36415 COLL VENOUS BLD VENIPUNCTURE: CPT

## 2024-10-09 PROCEDURE — 83036 HEMOGLOBIN GLYCOSYLATED A1C: CPT

## 2025-04-16 ENCOUNTER — HOSPITAL ENCOUNTER (OUTPATIENT)
Dept: LAB | Facility: MEDICAL CENTER | Age: 75
End: 2025-04-16
Attending: FAMILY MEDICINE
Payer: MEDICARE

## 2025-04-16 LAB
EST. AVERAGE GLUCOSE BLD GHB EST-MCNC: 177 MG/DL
HBA1C MFR BLD: 7.8 % (ref 4–5.6)

## 2025-04-16 PROCEDURE — 83036 HEMOGLOBIN GLYCOSYLATED A1C: CPT | Mod: GA

## 2025-04-16 PROCEDURE — 36415 COLL VENOUS BLD VENIPUNCTURE: CPT

## 2025-07-24 ENCOUNTER — HOSPITAL ENCOUNTER (OUTPATIENT)
Dept: LAB | Facility: MEDICAL CENTER | Age: 75
End: 2025-07-24
Attending: FAMILY MEDICINE
Payer: MEDICARE

## 2025-07-24 LAB
EST. AVERAGE GLUCOSE BLD GHB EST-MCNC: 146 MG/DL
HBA1C MFR BLD: 6.7 % (ref 4–5.6)

## 2025-07-24 PROCEDURE — 36415 COLL VENOUS BLD VENIPUNCTURE: CPT

## 2025-07-24 PROCEDURE — 83036 HEMOGLOBIN GLYCOSYLATED A1C: CPT | Mod: GA
